# Patient Record
Sex: FEMALE | Race: WHITE | NOT HISPANIC OR LATINO | Employment: PART TIME | URBAN - METROPOLITAN AREA
[De-identification: names, ages, dates, MRNs, and addresses within clinical notes are randomized per-mention and may not be internally consistent; named-entity substitution may affect disease eponyms.]

---

## 2023-03-10 ENCOUNTER — TELEPHONE (OUTPATIENT)
Dept: PRIMARY CARE | Facility: CLINIC | Age: 58
End: 2023-03-10
Payer: COMMERCIAL

## 2023-03-13 LAB
ALANINE AMINOTRANSFERASE (SGPT) (U/L) IN SER/PLAS: 10 U/L (ref 7–45)
ALBUMIN (G/DL) IN SER/PLAS: 4.7 G/DL (ref 3.4–5)
ALKALINE PHOSPHATASE (U/L) IN SER/PLAS: 74 U/L (ref 33–110)
ASPARTATE AMINOTRANSFERASE (SGOT) (U/L) IN SER/PLAS: 16 U/L (ref 9–39)
BASOPHILS (10*3/UL) IN BLOOD BY AUTOMATED COUNT: 0.04 X10E9/L (ref 0–0.1)
BASOPHILS/100 LEUKOCYTES IN BLOOD BY AUTOMATED COUNT: 0.9 % (ref 0–2)
BILIRUBIN DIRECT (MG/DL) IN SER/PLAS: 0 MG/DL (ref 0–0.3)
BILIRUBIN TOTAL (MG/DL) IN SER/PLAS: 0.3 MG/DL (ref 0–1.2)
EOSINOPHILS (10*3/UL) IN BLOOD BY AUTOMATED COUNT: 0.17 X10E9/L (ref 0–0.7)
EOSINOPHILS/100 LEUKOCYTES IN BLOOD BY AUTOMATED COUNT: 3.7 % (ref 0–6)
ERYTHROCYTE DISTRIBUTION WIDTH (RATIO) BY AUTOMATED COUNT: 13.4 % (ref 11.5–14.5)
ERYTHROCYTE MEAN CORPUSCULAR HEMOGLOBIN CONCENTRATION (G/DL) BY AUTOMATED: 34.3 G/DL (ref 32–36)
ERYTHROCYTE MEAN CORPUSCULAR VOLUME (FL) BY AUTOMATED COUNT: 99 FL (ref 80–100)
ERYTHROCYTES (10*6/UL) IN BLOOD BY AUTOMATED COUNT: 3.73 X10E12/L (ref 4–5.2)
HEMATOCRIT (%) IN BLOOD BY AUTOMATED COUNT: 37 % (ref 36–46)
HEMOGLOBIN (G/DL) IN BLOOD: 12.7 G/DL (ref 12–16)
IMMATURE GRANULOCYTES/100 LEUKOCYTES IN BLOOD BY AUTOMATED COUNT: 0.2 % (ref 0–0.9)
LEUKOCYTES (10*3/UL) IN BLOOD BY AUTOMATED COUNT: 4.6 X10E9/L (ref 4.4–11.3)
LYMPHOCYTES (10*3/UL) IN BLOOD BY AUTOMATED COUNT: 1.15 X10E9/L (ref 1.2–4.8)
LYMPHOCYTES/100 LEUKOCYTES IN BLOOD BY AUTOMATED COUNT: 25.2 % (ref 13–44)
MONOCYTES (10*3/UL) IN BLOOD BY AUTOMATED COUNT: 0.29 X10E9/L (ref 0.1–1)
MONOCYTES/100 LEUKOCYTES IN BLOOD BY AUTOMATED COUNT: 6.4 % (ref 2–10)
NEUTROPHILS (10*3/UL) IN BLOOD BY AUTOMATED COUNT: 2.9 X10E9/L (ref 1.2–7.7)
NEUTROPHILS/100 LEUKOCYTES IN BLOOD BY AUTOMATED COUNT: 63.6 % (ref 40–80)
PLATELETS (10*3/UL) IN BLOOD AUTOMATED COUNT: 312 X10E9/L (ref 150–450)
PROTEIN TOTAL: 7.4 G/DL (ref 6.4–8.2)

## 2023-03-14 LAB
IGA (MG/DL) IN SER/PLAS: 259 MG/DL (ref 70–400)
IGG (MG/DL) IN SER/PLAS: 1180 MG/DL (ref 700–1600)
IGM (MG/DL) IN SER/PLAS: 130 MG/DL (ref 40–230)

## 2023-05-02 DIAGNOSIS — E78.5 HYPERLIPIDEMIA, UNSPECIFIED: ICD-10-CM

## 2023-05-05 DIAGNOSIS — E78.5 HYPERLIPIDEMIA, UNSPECIFIED: ICD-10-CM

## 2023-05-05 RX ORDER — ROSUVASTATIN CALCIUM 20 MG/1
TABLET, COATED ORAL
Qty: 90 TABLET | Refills: 3 | Status: SHIPPED | OUTPATIENT
Start: 2023-05-05 | End: 2023-05-08

## 2023-05-08 DIAGNOSIS — E78.5 HYPERLIPIDEMIA, UNSPECIFIED: ICD-10-CM

## 2023-05-08 RX ORDER — ROSUVASTATIN CALCIUM 20 MG/1
TABLET, COATED ORAL
Qty: 90 TABLET | Refills: 3 | Status: SHIPPED | OUTPATIENT
Start: 2023-05-08 | End: 2023-05-11

## 2023-05-11 RX ORDER — ROSUVASTATIN CALCIUM 20 MG/1
TABLET, COATED ORAL
Qty: 90 TABLET | Refills: 3 | Status: SHIPPED | OUTPATIENT
Start: 2023-05-11 | End: 2023-10-05 | Stop reason: SDUPTHER

## 2023-06-02 LAB
ALANINE AMINOTRANSFERASE (SGPT) (U/L) IN SER/PLAS: 11 U/L (ref 7–45)
ALBUMIN (G/DL) IN SER/PLAS: 4.5 G/DL (ref 3.4–5)
ALKALINE PHOSPHATASE (U/L) IN SER/PLAS: 68 U/L (ref 33–110)
ASPARTATE AMINOTRANSFERASE (SGOT) (U/L) IN SER/PLAS: 14 U/L (ref 9–39)
BILIRUBIN DIRECT (MG/DL) IN SER/PLAS: 0 MG/DL (ref 0–0.3)
BILIRUBIN TOTAL (MG/DL) IN SER/PLAS: 0.3 MG/DL (ref 0–1.2)
PROTEIN TOTAL: 6.9 G/DL (ref 6.4–8.2)

## 2023-06-03 LAB
IGA (MG/DL) IN SER/PLAS: 238 MG/DL (ref 70–400)
IGG (MG/DL) IN SER/PLAS: 1090 MG/DL (ref 700–1600)
IGM (MG/DL) IN SER/PLAS: 122 MG/DL (ref 40–230)

## 2023-07-28 LAB
ALANINE AMINOTRANSFERASE (SGPT) (U/L) IN SER/PLAS: 11 U/L (ref 7–45)
ALBUMIN (G/DL) IN SER/PLAS: 4.6 G/DL (ref 3.4–5)
ALKALINE PHOSPHATASE (U/L) IN SER/PLAS: 71 U/L (ref 33–110)
ASPARTATE AMINOTRANSFERASE (SGOT) (U/L) IN SER/PLAS: 14 U/L (ref 9–39)
BASOPHILS (10*3/UL) IN BLOOD BY AUTOMATED COUNT: 0.02 X10E9/L (ref 0–0.1)
BASOPHILS/100 LEUKOCYTES IN BLOOD BY AUTOMATED COUNT: 0.3 % (ref 0–2)
BILIRUBIN DIRECT (MG/DL) IN SER/PLAS: 0.1 MG/DL (ref 0–0.3)
BILIRUBIN TOTAL (MG/DL) IN SER/PLAS: 0.4 MG/DL (ref 0–1.2)
EOSINOPHILS (10*3/UL) IN BLOOD BY AUTOMATED COUNT: 0.1 X10E9/L (ref 0–0.7)
EOSINOPHILS/100 LEUKOCYTES IN BLOOD BY AUTOMATED COUNT: 1.6 % (ref 0–6)
ERYTHROCYTE DISTRIBUTION WIDTH (RATIO) BY AUTOMATED COUNT: 12.6 % (ref 11.5–14.5)
ERYTHROCYTE MEAN CORPUSCULAR HEMOGLOBIN CONCENTRATION (G/DL) BY AUTOMATED: 33.9 G/DL (ref 32–36)
ERYTHROCYTE MEAN CORPUSCULAR VOLUME (FL) BY AUTOMATED COUNT: 97 FL (ref 80–100)
ERYTHROCYTES (10*6/UL) IN BLOOD BY AUTOMATED COUNT: 3.91 X10E12/L (ref 4–5.2)
HEMATOCRIT (%) IN BLOOD BY AUTOMATED COUNT: 37.8 % (ref 36–46)
HEMOGLOBIN (G/DL) IN BLOOD: 12.8 G/DL (ref 12–16)
IMMATURE GRANULOCYTES/100 LEUKOCYTES IN BLOOD BY AUTOMATED COUNT: 0.3 % (ref 0–0.9)
LEUKOCYTES (10*3/UL) IN BLOOD BY AUTOMATED COUNT: 6.2 X10E9/L (ref 4.4–11.3)
LYMPHOCYTES (10*3/UL) IN BLOOD BY AUTOMATED COUNT: 0.82 X10E9/L (ref 1.2–4.8)
LYMPHOCYTES/100 LEUKOCYTES IN BLOOD BY AUTOMATED COUNT: 13.2 % (ref 13–44)
MONOCYTES (10*3/UL) IN BLOOD BY AUTOMATED COUNT: 0.43 X10E9/L (ref 0.1–1)
MONOCYTES/100 LEUKOCYTES IN BLOOD BY AUTOMATED COUNT: 6.9 % (ref 2–10)
NEUTROPHILS (10*3/UL) IN BLOOD BY AUTOMATED COUNT: 4.84 X10E9/L (ref 1.2–7.7)
NEUTROPHILS/100 LEUKOCYTES IN BLOOD BY AUTOMATED COUNT: 77.7 % (ref 40–80)
PLATELETS (10*3/UL) IN BLOOD AUTOMATED COUNT: 306 X10E9/L (ref 150–450)
PROTEIN TOTAL: 7.3 G/DL (ref 6.4–8.2)

## 2023-07-29 LAB
IGA (MG/DL) IN SER/PLAS: 255 MG/DL (ref 70–400)
IGG (MG/DL) IN SER/PLAS: 1120 MG/DL (ref 700–1600)
IGM (MG/DL) IN SER/PLAS: 140 MG/DL (ref 40–230)

## 2023-10-05 DIAGNOSIS — E78.5 HYPERLIPIDEMIA, UNSPECIFIED: ICD-10-CM

## 2023-10-05 RX ORDER — ROSUVASTATIN CALCIUM 20 MG/1
TABLET, COATED ORAL
Qty: 90 TABLET | Refills: 3 | Status: SHIPPED | OUTPATIENT
Start: 2023-10-05

## 2023-10-10 ENCOUNTER — TELEPHONE (OUTPATIENT)
Dept: PRIMARY CARE | Facility: CLINIC | Age: 58
End: 2023-10-10
Payer: COMMERCIAL

## 2023-11-13 DIAGNOSIS — K75.4 AUTOIMMUNE HEPATITIS (MULTI): Primary | ICD-10-CM

## 2023-11-13 RX ORDER — AZATHIOPRINE 50 MG/1
75 TABLET ORAL DAILY
COMMUNITY
End: 2023-11-13 | Stop reason: SDUPTHER

## 2023-11-14 RX ORDER — AZATHIOPRINE 50 MG/1
75 TABLET ORAL DAILY
Qty: 135 TABLET | Refills: 1 | Status: SHIPPED | OUTPATIENT
Start: 2023-11-14 | End: 2024-04-04 | Stop reason: SDUPTHER

## 2024-01-23 NOTE — PROGRESS NOTES
Subjective   Chana Ramirez is a 58 y.o. female who presents for physical exam.  DAQUAN Zayas was last seen by me October 19, 2022 she also follows with Dr. Cooper Lowry MD hepatology which you have seen more recently September 14, 2023 for autoimmune hepatitis required steroid taper and azathioprine history of dyslipidemia was prescribed rosuvastatin which she takes regularly last refilled October 5, 2023 patient was in the management complaint no chest pain shortness of breath fever chill nausea vomiting constipation diarrhea this urgency frequency.  Patient is here for fasting blood works, physical exam. Constipation from time to time, with bloating, Acid reflux.   Review of Systems  10 system are pertinent as above  Objective     Visit Vitals  /80   Pulse 78   Temp 36.3 °C (97.3 °F) (Temporal)   Resp 16      Physical Exam  HEENT: Atraumatic normocephalic the pupils are equal and round and reactive to light the sclerae nonicteric extraocular motion are intact.  Neck: Is supple without JVD no carotid bruits the trachea is midline there are no masses pulses are equal and bilateral with normal upstroke.  Skin: Normal.  Skin good texture.  Moist.  Good turgor.  No lesions, no rashes.  Lymph: No lymphadenopathy appreciated, no masses, no lesions  Lungs: Are clear to auscultation and percussion, good breath sounds bilaterally, no rhonchi, no wheezing, good diaphragmatic excursion.  Heart: Normal rate and normal rhythm S1, S2, no S3, no gallop, murmur or rub.  Abdomen: Soft, nontender, no organomegaly, good bowel sounds.    Extremities: Full range of motion, good pulses bilateral.  No cyanosis, no clubbing or edema.  Neuro: Cranial nerves II-XII are grossly intact there is no sensory or motor deficits.  Able to move all extremities.      Assessment/Plan     Patient is here for physical exam    Fasting blood works    CBC BMP lipids AST LT vitamin 25-hydroxy     Prevention  Colonoscopy 05/22/20219  Mammogram  Dr Lobo last Mamm on record 09/24/22, reordered per patient request  Bone density needed  CACS 32.74 12/2020    Acid reflux   Will try OC Pepcid  Avoid spicy food   If not better will let me know    Immunization  Flu vaccine  10/2024  pneumonia vaccine age 67  Shingles vaccine considering , now with a little sinus issues  [place] vaccine 2/2    Continue with the low-fat, low-cholesterol diet,  I recommended Mediterranean diet, which include fish, chicken, vegetables and olive oil  Exercise daily for 30 minutes at least 3 times a week  Continue home medications  Rosuvastatin 20 mg M,W,F    History of autoimmune hepatitis  Follows with  hepatology  Last visit was September 14, 2023  She appears to be doing well  On azathioprine 75 mg daily steroid completed          Problem List Items Addressed This Visit       Autoimmune hepatitis (CMS/HCC)    Relevant Orders    CBC    Lipid Panel    AST    ALT    Dyslipidemia - Primary    Relevant Orders    Lipid Panel    AST    ALT    Immunosuppression (CMS/HCC)    Relevant Orders    CBC    AST    ALT    Vitamin D deficiency, unspecified    Relevant Orders    Basic Metabolic Panel     Other Visit Diagnoses       Postmenopause        Relevant Orders    XR DEXA bone density    History of anabolic steroid use        Relevant Orders    XR DEXA bone density    Basic Metabolic Panel    Vitamin D insufficiency        Relevant Orders    Vitamin D 25-Hydroxy,Total (for eval of Vitamin D levels)    Screening mammogram for breast cancer        Relevant Orders    BI mammo bilateral screening tomosynthesis              Kit Rhodes MD

## 2024-01-26 ENCOUNTER — OFFICE VISIT (OUTPATIENT)
Dept: PRIMARY CARE | Facility: CLINIC | Age: 59
End: 2024-01-26
Payer: COMMERCIAL

## 2024-01-26 VITALS
TEMPERATURE: 97.3 F | DIASTOLIC BLOOD PRESSURE: 80 MMHG | WEIGHT: 164 LBS | RESPIRATION RATE: 16 BRPM | HEART RATE: 78 BPM | HEIGHT: 62 IN | BODY MASS INDEX: 30.18 KG/M2 | SYSTOLIC BLOOD PRESSURE: 122 MMHG

## 2024-01-26 DIAGNOSIS — E78.5 DYSLIPIDEMIA: Primary | ICD-10-CM

## 2024-01-26 DIAGNOSIS — E55.9 VITAMIN D INSUFFICIENCY: ICD-10-CM

## 2024-01-26 DIAGNOSIS — Z12.31 SCREENING MAMMOGRAM FOR BREAST CANCER: ICD-10-CM

## 2024-01-26 DIAGNOSIS — D84.9 IMMUNOSUPPRESSION (MULTI): ICD-10-CM

## 2024-01-26 DIAGNOSIS — E55.9 VITAMIN D DEFICIENCY, UNSPECIFIED: ICD-10-CM

## 2024-01-26 DIAGNOSIS — Z78.0 POSTMENOPAUSE: ICD-10-CM

## 2024-01-26 DIAGNOSIS — Z92.241 HISTORY OF ANABOLIC STEROID USE: ICD-10-CM

## 2024-01-26 DIAGNOSIS — K75.4 AUTOIMMUNE HEPATITIS (MULTI): ICD-10-CM

## 2024-01-26 PROCEDURE — 84460 ALANINE AMINO (ALT) (SGPT): CPT | Performed by: INTERNAL MEDICINE

## 2024-01-26 PROCEDURE — 80048 BASIC METABOLIC PNL TOTAL CA: CPT | Performed by: INTERNAL MEDICINE

## 2024-01-26 PROCEDURE — 99396 PREV VISIT EST AGE 40-64: CPT | Performed by: INTERNAL MEDICINE

## 2024-01-26 PROCEDURE — 82306 VITAMIN D 25 HYDROXY: CPT | Performed by: INTERNAL MEDICINE

## 2024-01-26 PROCEDURE — 80061 LIPID PANEL: CPT | Performed by: INTERNAL MEDICINE

## 2024-01-26 PROCEDURE — 85025 COMPLETE CBC W/AUTO DIFF WBC: CPT | Performed by: INTERNAL MEDICINE

## 2024-01-26 PROCEDURE — 84450 TRANSFERASE (AST) (SGOT): CPT | Performed by: INTERNAL MEDICINE

## 2024-01-26 PROCEDURE — 1036F TOBACCO NON-USER: CPT | Performed by: INTERNAL MEDICINE

## 2024-01-26 RX ORDER — VIT C/E/ZN/COPPR/LUTEIN/ZEAXAN 250MG-90MG
1 CAPSULE ORAL DAILY
COMMUNITY

## 2024-01-26 ASSESSMENT — PAIN SCALES - GENERAL: PAINLEVEL: 0-NO PAIN

## 2024-01-29 ENCOUNTER — TELEPHONE (OUTPATIENT)
Dept: PRIMARY CARE | Facility: CLINIC | Age: 59
End: 2024-01-29
Payer: COMMERCIAL

## 2024-01-29 NOTE — TELEPHONE ENCOUNTER
----- Message from Kit Rhodes MD sent at 1/28/2024  2:36 PM EST -----  Good overall blood work, continue with current medications diet and exercise will repeat  fasting Blood work in three months

## 2024-02-09 ENCOUNTER — HOSPITAL ENCOUNTER (OUTPATIENT)
Dept: RADIOLOGY | Facility: HOSPITAL | Age: 59
Discharge: HOME | End: 2024-02-09
Payer: COMMERCIAL

## 2024-02-09 DIAGNOSIS — Z12.31 SCREENING MAMMOGRAM FOR BREAST CANCER: ICD-10-CM

## 2024-02-09 DIAGNOSIS — Z92.241 HISTORY OF ANABOLIC STEROID USE: ICD-10-CM

## 2024-02-09 DIAGNOSIS — Z78.0 POSTMENOPAUSE: ICD-10-CM

## 2024-02-09 PROCEDURE — 77080 DXA BONE DENSITY AXIAL: CPT | Performed by: RADIOLOGY

## 2024-02-09 PROCEDURE — 77080 DXA BONE DENSITY AXIAL: CPT

## 2024-02-09 PROCEDURE — 77063 BREAST TOMOSYNTHESIS BI: CPT | Performed by: RADIOLOGY

## 2024-02-09 PROCEDURE — 77067 SCR MAMMO BI INCL CAD: CPT

## 2024-02-09 PROCEDURE — 77067 SCR MAMMO BI INCL CAD: CPT | Performed by: RADIOLOGY

## 2024-02-11 DIAGNOSIS — R92.8 ABNORMAL MAMMOGRAM: Primary | ICD-10-CM

## 2024-02-13 ENCOUNTER — TELEPHONE (OUTPATIENT)
Dept: GASTROENTEROLOGY | Facility: CLINIC | Age: 59
End: 2024-02-13
Payer: COMMERCIAL

## 2024-02-13 NOTE — TELEPHONE ENCOUNTER
"Hepatology Nurse Coordinator Note  Called patient back. She was about to start a class at work. Requested a call back after 3:30 pm.      Addendum 2/13/2024 3:46 PM  Spoke with patient's regarding GI concerns she is reporting. Patient states she has been having \"digestive issues consistently for the past few weeks.\" Patient endorses constant bloating, as well as flatus, and belching. She states she feels \"blah\" lately. She states her \"bowel movements are very small, and I have to keep wiping even though a little comes out.\" She states she did have a \"3 day period where she didn't have a bowel movement.\" She states her PCP wrote for metamucil a \"few weeks ago. It's not helping, but I'm still taking it.\" Patient did endorse her GI issues are \"worse after meals.\" She would like to know if Dr. Lowry can provide recommendations and manage this, or wants to know if she needs to be referred to GI. Patient is aware I would forward her concerns to Dr. Lowry. She verbalized understanding.   "

## 2024-02-16 NOTE — TELEPHONE ENCOUNTER
Hepatology Nurse Coordinator Note  Called patient to provide recommendation from Dr. Lowry regarding her concerns. She's aware Dr. Lowry is okay assessing her GI symptoms, but that he would like to see her in clinic for follow up and to assess. Patient made aware. She was scheduled for first available visit in April. She's aware I would speak with Dr. Lowry to see if she is able to be seen sooner. She verbalized understanding.

## 2024-02-22 ENCOUNTER — HOSPITAL ENCOUNTER (OUTPATIENT)
Dept: RADIOLOGY | Facility: HOSPITAL | Age: 59
Discharge: HOME | End: 2024-02-22
Payer: COMMERCIAL

## 2024-02-22 DIAGNOSIS — R92.8 ABNORMAL MAMMOGRAM: Primary | ICD-10-CM

## 2024-02-22 DIAGNOSIS — R92.8 ABNORMAL MAMMOGRAM: ICD-10-CM

## 2024-02-22 PROCEDURE — 76981 USE PARENCHYMA: CPT | Mod: LT

## 2024-02-22 PROCEDURE — 76642 ULTRASOUND BREAST LIMITED: CPT | Mod: LT

## 2024-03-27 PROBLEM — H10.9 CONJUNCTIVITIS: Status: ACTIVE | Noted: 2024-03-27

## 2024-03-27 PROBLEM — B34.9 VIRAL INFECTION: Status: ACTIVE | Noted: 2024-03-27

## 2024-03-27 PROBLEM — R63.8 UNABLE TO LOSE WEIGHT: Status: ACTIVE | Noted: 2024-03-27

## 2024-03-27 PROBLEM — R32 INCONTINENCE: Status: ACTIVE | Noted: 2024-03-27

## 2024-03-27 PROBLEM — Z86.69 HISTORY OF DISORDER OF GLOBE OF EYE: Status: ACTIVE | Noted: 2024-03-27

## 2024-03-27 PROBLEM — R52 GENERALIZED PAIN: Status: ACTIVE | Noted: 2024-03-27

## 2024-03-27 PROBLEM — N95.8 GENITOURINARY SYNDROME OF MENOPAUSE: Status: ACTIVE | Noted: 2024-03-27

## 2024-03-27 PROBLEM — R17 JAUNDICE: Status: ACTIVE | Noted: 2024-03-27

## 2024-03-27 PROBLEM — M25.569 KNEE PAIN: Status: ACTIVE | Noted: 2024-03-27

## 2024-03-27 PROBLEM — M25.50 ARTHRALGIA: Status: ACTIVE | Noted: 2024-03-27

## 2024-03-27 PROBLEM — M75.82 TENDINITIS OF LEFT ROTATOR CUFF: Status: ACTIVE | Noted: 2024-03-27

## 2024-03-27 PROBLEM — M15.9 OSTEOARTHRITIS OF MULTIPLE JOINTS: Status: ACTIVE | Noted: 2024-03-27

## 2024-03-27 PROBLEM — M79.7 FIBROMYALGIA: Status: ACTIVE | Noted: 2024-03-27

## 2024-03-27 RX ORDER — ACETAMINOPHEN 325 MG/1
TABLET ORAL
COMMUNITY

## 2024-03-27 RX ORDER — AA/PROT/LYSINE/METHIO/VIT C/B6 50-12.5 MG
TABLET ORAL
COMMUNITY

## 2024-03-27 RX ORDER — IBUPROFEN 200 MG
TABLET ORAL
COMMUNITY

## 2024-04-04 ENCOUNTER — OFFICE VISIT (OUTPATIENT)
Dept: GASTROENTEROLOGY | Facility: CLINIC | Age: 59
End: 2024-04-04
Payer: COMMERCIAL

## 2024-04-04 ENCOUNTER — LAB (OUTPATIENT)
Dept: LAB | Facility: LAB | Age: 59
End: 2024-04-04
Payer: COMMERCIAL

## 2024-04-04 VITALS
TEMPERATURE: 96.6 F | HEART RATE: 91 BPM | BODY MASS INDEX: 30.18 KG/M2 | SYSTOLIC BLOOD PRESSURE: 111 MMHG | WEIGHT: 165 LBS | DIASTOLIC BLOOD PRESSURE: 73 MMHG

## 2024-04-04 DIAGNOSIS — R19.4 CHANGE IN BOWEL HABITS: ICD-10-CM

## 2024-04-04 DIAGNOSIS — K75.4 AUTOIMMUNE HEPATITIS (MULTI): ICD-10-CM

## 2024-04-04 DIAGNOSIS — K75.4 AUTOIMMUNE HEPATITIS (MULTI): Primary | ICD-10-CM

## 2024-04-04 DIAGNOSIS — Z79.899 HIGH RISK MEDICATION USE: ICD-10-CM

## 2024-04-04 LAB
ALBUMIN SERPL BCP-MCNC: 4.6 G/DL (ref 3.4–5)
ALP SERPL-CCNC: 73 U/L (ref 33–110)
ALT SERPL W P-5'-P-CCNC: 12 U/L (ref 7–45)
AST SERPL W P-5'-P-CCNC: 15 U/L (ref 9–39)
BASOPHILS # BLD AUTO: 0.03 X10*3/UL (ref 0–0.1)
BASOPHILS NFR BLD AUTO: 0.6 %
BILIRUB DIRECT SERPL-MCNC: 0 MG/DL (ref 0–0.3)
BILIRUB SERPL-MCNC: 0.3 MG/DL (ref 0–1.2)
EOSINOPHIL # BLD AUTO: 0.18 X10*3/UL (ref 0–0.7)
EOSINOPHIL NFR BLD AUTO: 3.5 %
ERYTHROCYTE [DISTWIDTH] IN BLOOD BY AUTOMATED COUNT: 13.2 % (ref 11.5–14.5)
HCT VFR BLD AUTO: 38.7 % (ref 36–46)
HGB BLD-MCNC: 12.5 G/DL (ref 12–16)
IMM GRANULOCYTES # BLD AUTO: 0.01 X10*3/UL (ref 0–0.7)
IMM GRANULOCYTES NFR BLD AUTO: 0.2 % (ref 0–0.9)
LYMPHOCYTES # BLD AUTO: 1.01 X10*3/UL (ref 1.2–4.8)
LYMPHOCYTES NFR BLD AUTO: 19.8 %
MCH RBC QN AUTO: 32.3 PG (ref 26–34)
MCHC RBC AUTO-ENTMCNC: 32.3 G/DL (ref 32–36)
MCV RBC AUTO: 100 FL (ref 80–100)
MONOCYTES # BLD AUTO: 0.4 X10*3/UL (ref 0.1–1)
MONOCYTES NFR BLD AUTO: 7.8 %
NEUTROPHILS # BLD AUTO: 3.47 X10*3/UL (ref 1.2–7.7)
NEUTROPHILS NFR BLD AUTO: 68.1 %
NRBC BLD-RTO: 0 /100 WBCS (ref 0–0)
PLATELET # BLD AUTO: 297 X10*3/UL (ref 150–450)
PROT SERPL-MCNC: 7.2 G/DL (ref 6.4–8.2)
RBC # BLD AUTO: 3.87 X10*6/UL (ref 4–5.2)
WBC # BLD AUTO: 5.1 X10*3/UL (ref 4.4–11.3)

## 2024-04-04 PROCEDURE — 85025 COMPLETE CBC W/AUTO DIFF WBC: CPT

## 2024-04-04 PROCEDURE — 99215 OFFICE O/P EST HI 40 MIN: CPT | Performed by: INTERNAL MEDICINE

## 2024-04-04 PROCEDURE — 36415 COLL VENOUS BLD VENIPUNCTURE: CPT

## 2024-04-04 PROCEDURE — 80076 HEPATIC FUNCTION PANEL: CPT

## 2024-04-04 RX ORDER — AZATHIOPRINE 50 MG/1
75 TABLET ORAL DAILY
Qty: 45 TABLET | Refills: 11 | Status: SHIPPED | OUTPATIENT
Start: 2024-04-04 | End: 2024-05-28 | Stop reason: SDUPTHER

## 2024-04-04 ASSESSMENT — PAIN SCALES - GENERAL: PAINLEVEL: 0-NO PAIN

## 2024-04-04 NOTE — LETTER
April 12, 2024     Kit Rhodes MD  [address] [facility] [address]    Patient: Chana Ramirez   YOB: 1965   Date of Visit: 4/4/2024       Dear Dr. Kit Rhodes MD:    Thank you for referring Chana Ramirez to me for evaluation. Below are my notes for this consultation.  If you have questions, please do not hesitate to call me. I look forward to following your patient along with you.       Sincerely,     Cooper Lowry MD      CC: No Recipients  ______________________________________________________________________________________    Subjective     Follow up visit for AIH.    History of Present Illness:   Chana Ramirez is a 58 y.o. female who presents to the [facility] at [facility] for a follow up appointment regarding a diagnosis of AIH.    Today, she reports a number of gastrointestinal symptoms.    Nausea - since January. No emesis.  Belching, flatulence.   Some heartburn.    In the past week:  Describes a tarry type of stool, not black. ? Pastey. Brown.   Also small pellets.  Previously, very regular.    PCP Dr. Rhodes, recommended Metamucil.  Used it every day until had a bowel movement. ~ 1 week.  Then stopped.    Summary:  57 y/o F otherwise healthy admitted with a severe hepatitis and high LFTs at the end of March 2020 - first at [facility] and transferred downtown to [facility].     Prior to admission, she was feeling poorly and run down. Was seen at the [facility] x 2 and prescribed Keflex for a possible UTI. She tested positive for Influenza A. COVID-19 PCR negative.      She has asked if it is possible that she had COVID prior to this hospitalization - also inquired about antibody testing.   She did complete antibody testing - SARS COV 2 IgG negative.     YAKOV, SMA both 1-80.  A liver biopsy was done - severe hepatitis but not classic for AIH.  She was started on prednisone 20 mg. F/u labs on April 2, 2020 not much better with ALT > 1000.   Prednisone  was increased to 40 mg daily by Dr. Dumas.      A week later Azathioprine was prescribed (50 mg daily). She had a biochemical response to the higher dose of prednisone. She had resolution of all of her symptoms and a complete biochemical response. Her prednisone was further tapered down to 20 mg daily with a very slight bump in her LFTs.   Prednisone was increased to 25 mg, Azathioprine to 75 mg daily.      She has since achieved biochemical remission.   Off prednisone since April 2021.   Doing well on Azathioprine 75 mg daily (decreased from 100 mg)..     Overall, she feels well.   No symptoms of jaundice or abdominal pain.  Today, we reviewed labwork, Fibroscan results.     She has established care with a PCP, Dr. Rhodes.      Jan 2024 Labs  ALT 24, AST 16    Review of Systems  Review of Systems   All other systems reviewed and are negative.      Past Medical History   has a past medical history of Personal history of other diseases of the nervous system and sense organs.     Social History   reports that she has never smoked. She has never been exposed to tobacco smoke. She has never used smokeless tobacco. She reports that she does not currently use alcohol. She reports that she does not use drugs.     Family History  family history is not on file.     Allergies  Allergies   Allergen Reactions   • Guaifenesin Rash       Medications  Current Outpatient Medications   Medication Instructions   • acetaminophen (Tylenol) 325 mg tablet oral   • azaTHIOprine (IMURAN) 75 mg, oral, Daily   • cholecalciferol (Vitamin D-3) 25 MCG (1000 UT) capsule 1 tablet, oral, Daily   • coenzyme Q-10 10 mg capsule oral   • ibuprofen 200 mg tablet oral   • rosuvastatin (Crestor) 20 mg tablet TAKE 1 TABLET OTHER 1 TABLET ON MONDAY 1 TABLET ON FRIDAY CO-Q10 200 MG MONDAY WEDNESDAY AND FRIDAY        Objective   Visit Vitals  /73   Pulse 91   Temp 35.9 °C (96.6 °F)          12/13/2021     4:10 PM 12/13/2021     4:14 PM 3/11/2022     "12:34 PM 10/14/2022     1:38 PM 10/19/2022    11:40 AM 1/26/2024     9:08 AM 4/4/2024     2:36 PM   Vitals   Systolic  108  107  122 111   Diastolic  62  70  80 73   Heart Rate    77  78 91   Temp    36.4 °C (97.6 °F)  36.3 °C (97.3 °F) 35.9 °C (96.6 °F)   Resp      16    Height (in)    1.575 m (5' 2\")  1.575 m (5' 2\")    Weight (lb) 164  160 162 160 164 165   BMI 29.28 kg/m2  28.57 kg/m2 29.63 kg/m2 29.26 kg/m2 30 kg/m2 30.18 kg/m2   BSA (m2) 1.81 m2  1.79 m2 1.79 m2 1.78 m2 1.8 m2 1.81 m2   Visit Report      Report Report     Physical Exam  Vitals and nursing note reviewed.   Constitutional:       General: She is awake.      Appearance: Normal appearance.   HENT:      Head: Normocephalic and atraumatic.      Nose: Nose normal.      Mouth/Throat:      Mouth: Mucous membranes are moist.   Eyes:      Pupils: Pupils are equal, round, and reactive to light.   Cardiovascular:      Rate and Rhythm: Normal rate.   Pulmonary:      Effort: Pulmonary effort is normal.   Neurological:      Mental Status: She is alert and oriented to person, place, and time. Mental status is at baseline.   Psychiatric:         Attention and Perception: Attention and perception normal.         Mood and Affect: Mood normal.         Behavior: Behavior normal.         Labs    WBC   Date/Time Value Ref Range Status   07/28/2023 03:17 PM 6.2 4.4 - 11.3 x10E9/L Final     Hemoglobin   Date/Time Value Ref Range Status   07/28/2023 03:17 PM 12.8 12.0 - 16.0 g/dL Final     Hematocrit   Date/Time Value Ref Range Status   07/28/2023 03:17 PM 37.8 36.0 - 46.0 % Final     MCV   Date/Time Value Ref Range Status   07/28/2023 03:17 PM 97 80 - 100 fL Final     Platelets   Date/Time Value Ref Range Status   07/28/2023 03:17  150 - 450 x10E9/L Final        Total Protein   Date/Time Value Ref Range Status   07/28/2023 03:17 PM 7.3 6.4 - 8.2 g/dL Final     Albumin   Date/Time Value Ref Range Status   07/28/2023 03:17 PM 4.6 3.4 - 5.0 g/dL Final     AST " "  Date/Time Value Ref Range Status   07/28/2023 03:17 PM 14 9 - 39 U/L Final     ALT (SGPT)   Date/Time Value Ref Range Status   07/28/2023 03:17 PM 11 7 - 45 U/L Final     Comment:      Patients treated with Sulfasalazine may generate    falsely decreased results for ALT.       Alkaline Phosphatase   Date/Time Value Ref Range Status   07/28/2023 03:17 PM 71 33 - 110 U/L Final     Total Bilirubin   Date/Time Value Ref Range Status   07/28/2023 03:17 PM 0.4 0.0 - 1.2 mg/dL Final     Bilirubin, Direct   Date/Time Value Ref Range Status   07/28/2023 03:17 PM 0.1 0.0 - 0.3 mg/dL Final        No results found for: \"VITD25\", \"VITAMINA\", \"VTAI\", \"VITEALPHA\", \"VITEGAMMA\"     AST   Date/Time Value Ref Range Status   07/28/2023 03:17 PM 14 9 - 39 U/L Final     ALT (SGPT)   Date/Time Value Ref Range Status   07/28/2023 03:17 PM 11 7 - 45 U/L Final     Comment:      Patients treated with Sulfasalazine may generate    falsely decreased results for ALT.       Alkaline Phosphatase   Date/Time Value Ref Range Status   07/28/2023 03:17 PM 71 33 - 110 U/L Final     Total Bilirubin   Date/Time Value Ref Range Status   07/28/2023 03:17 PM 0.4 0.0 - 1.2 mg/dL Final     Bilirubin, Direct   Date/Time Value Ref Range Status   07/28/2023 03:17 PM 0.1 0.0 - 0.3 mg/dL Final     Albumin   Date/Time Value Ref Range Status   07/28/2023 03:17 PM 4.6 3.4 - 5.0 g/dL Final     Total Protein   Date/Time Value Ref Range Status   07/28/2023 03:17 PM 7.3 6.4 - 8.2 g/dL Final        Protime   Date/Time Value Ref Range Status   12/06/2021 04:11 PM 10.8 9.8 - 13.4 sec Final     Comment:     Note new reference range as of 11/30/2021 at 10:00am.     INR   Date/Time Value Ref Range Status   12/06/2021 04:11 PM 0.9 0.9 - 1.1 Final     Fibroscan  2023    Results:     Median = 3.4 kPa       IQR/med= 8 %       CAP= 146 dB/m    Assessment/Plan   Chana Ramirez is a 58 y.o. female who presents to Hepatology clinic for follow up.    Impression: Autoimmune " hepatitis - with a biochemical response. LFTs bumped (slightly) during initial taper when prednisone dose was reduced to 20 mg daily. Normal LFTs with subsequent changes. In remission.      prednisone was discontinued - April 2021;  She is tolerating Azathioprine 75 mg - without any issues.  We reduced the dose of the Azathioprine at her last appointment about 6 m ago (from 100 mg) - will continue at this dose.     Statin use is safe in liver disease. Her autoimmune hepatitis is under control, with normal LFTs and biochemical remission. She can be safely prescribed a daily statin, if clinically indicated. I do note the recent Lipid panel. I encouraged her to discuss further with Dr. Rhodes.    We discussed her bowel symptoms. I have suggested Metamucil daily (1 tsp for about 2 weeks, and to increase two 1 tbsp daily as tolerated) to help regulate her bowel movements.       Plan:     Azathioprine 75 mg daily (continue).   Fibroscan of the liver - 9/1/23 (no fibrosis):    Repeat LFTs every 6m. Follow up in 6m.    Instructions      Cooper Lowry MD

## 2024-04-04 NOTE — PROGRESS NOTES
Subjective     Follow up visit for AIH.    History of Present Illness:   Chana Ramirez is a 58 y.o. female who presents to the [facility] at [facility] for a follow up appointment regarding a diagnosis of AIH.    Today, she reports a number of gastrointestinal symptoms.    Nausea - since January. No emesis.  Belching, flatulence.   Some heartburn.    In the past week:  Describes a tarry type of stool, not black. ? Pastey. Brown.   Also small pellets.  Previously, very regular.    PCP Dr. Rhodes, recommended Metamucil.  Used it every day until had a bowel movement. ~ 1 week.  Then stopped.    Summary:  59 y/o F otherwise healthy admitted with a severe hepatitis and high LFTs at the end of March 2020 - first at [facility] and transferred downtown to [facility].     Prior to admission, she was feeling poorly and run down. Was seen at [facility] x 2 and prescribed Keflex for a possible UTI. She tested positive for Influenza A. COVID-19 PCR negative.      She has asked if it is possible that she had COVID prior to this hospitalization - also inquired about antibody testing.   She did complete antibody testing - SARS COV 2 IgG negative.     YAKOV, SMA both 1-80.  A liver biopsy was done - severe hepatitis but not classic for AIH.  She was started on prednisone 20 mg. F/u labs on April 2, 2020 not much better with ALT > 1000.   Prednisone was increased to 40 mg daily by Dr. Dumas.      A week later Azathioprine was prescribed (50 mg daily). She had a biochemical response to the higher dose of prednisone. She had resolution of all of her symptoms and a complete biochemical response. Her prednisone was further tapered down to 20 mg daily with a very slight bump in her LFTs.   Prednisone was increased to 25 mg, Azathioprine to 75 mg daily.      She has since achieved biochemical remission.   Off prednisone since April 2021.   Doing well on Azathioprine 75 mg daily (decreased from 100 mg)..     Overall,  "she feels well.   No symptoms of jaundice or abdominal pain.  Today, we reviewed labwork, Fibroscan results.     She has established care with a PCP, Dr. Rhodes.      Jan 2024 Labs  ALT 24, AST 16    Review of Systems  Review of Systems   All other systems reviewed and are negative.      Past Medical History   has a past medical history of Personal history of other diseases of the nervous system and sense organs.     Social History   reports that she has never smoked. She has never been exposed to tobacco smoke. She has never used smokeless tobacco. She reports that she does not currently use alcohol. She reports that she does not use drugs.     Family History  family history is not on file.     Allergies  Allergies   Allergen Reactions    Guaifenesin Rash       Medications  Current Outpatient Medications   Medication Instructions    acetaminophen (Tylenol) 325 mg tablet oral    azaTHIOprine (IMURAN) 75 mg, oral, Daily    cholecalciferol (Vitamin D-3) 25 MCG (1000 UT) capsule 1 tablet, oral, Daily    coenzyme Q-10 10 mg capsule oral    ibuprofen 200 mg tablet oral    rosuvastatin (Crestor) 20 mg tablet TAKE 1 TABLET OTHER 1 TABLET ON MONDAY 1 TABLET ON FRIDAY CO-Q10 200 MG MONDAY WEDNESDAY AND FRIDAY        Objective   Visit Vitals  /73   Pulse 91   Temp 35.9 °C (96.6 °F)          12/13/2021     4:10 PM 12/13/2021     4:14 PM 3/11/2022    12:34 PM 10/14/2022     1:38 PM 10/19/2022    11:40 AM 1/26/2024     9:08 AM 4/4/2024     2:36 PM   Vitals   Systolic  108  107  122 111   Diastolic  62  70  80 73   Heart Rate    77  78 91   Temp    36.4 °C (97.6 °F)  36.3 °C (97.3 °F) 35.9 °C (96.6 °F)   Resp      16    Height (in)    1.575 m (5' 2\")  1.575 m (5' 2\")    Weight (lb) 164  160 162 160 164 165   BMI 29.28 kg/m2  28.57 kg/m2 29.63 kg/m2 29.26 kg/m2 30 kg/m2 30.18 kg/m2   BSA (m2) 1.81 m2  1.79 m2 1.79 m2 1.78 m2 1.8 m2 1.81 m2   Visit Report      Report Report     Physical Exam  Vitals and nursing note reviewed. "   Constitutional:       General: She is awake.      Appearance: Normal appearance.   HENT:      Head: Normocephalic and atraumatic.      Nose: Nose normal.      Mouth/Throat:      Mouth: Mucous membranes are moist.   Eyes:      Pupils: Pupils are equal, round, and reactive to light.   Cardiovascular:      Rate and Rhythm: Normal rate.   Pulmonary:      Effort: Pulmonary effort is normal.   Neurological:      Mental Status: She is alert and oriented to person, place, and time. Mental status is at baseline.   Psychiatric:         Attention and Perception: Attention and perception normal.         Mood and Affect: Mood normal.         Behavior: Behavior normal.         Labs    WBC   Date/Time Value Ref Range Status   07/28/2023 03:17 PM 6.2 4.4 - 11.3 x10E9/L Final     Hemoglobin   Date/Time Value Ref Range Status   07/28/2023 03:17 PM 12.8 12.0 - 16.0 g/dL Final     Hematocrit   Date/Time Value Ref Range Status   07/28/2023 03:17 PM 37.8 36.0 - 46.0 % Final     MCV   Date/Time Value Ref Range Status   07/28/2023 03:17 PM 97 80 - 100 fL Final     Platelets   Date/Time Value Ref Range Status   07/28/2023 03:17  150 - 450 x10E9/L Final        Total Protein   Date/Time Value Ref Range Status   07/28/2023 03:17 PM 7.3 6.4 - 8.2 g/dL Final     Albumin   Date/Time Value Ref Range Status   07/28/2023 03:17 PM 4.6 3.4 - 5.0 g/dL Final     AST   Date/Time Value Ref Range Status   07/28/2023 03:17 PM 14 9 - 39 U/L Final     ALT (SGPT)   Date/Time Value Ref Range Status   07/28/2023 03:17 PM 11 7 - 45 U/L Final     Comment:      Patients treated with Sulfasalazine may generate    falsely decreased results for ALT.       Alkaline Phosphatase   Date/Time Value Ref Range Status   07/28/2023 03:17 PM 71 33 - 110 U/L Final     Total Bilirubin   Date/Time Value Ref Range Status   07/28/2023 03:17 PM 0.4 0.0 - 1.2 mg/dL Final     Bilirubin, Direct   Date/Time Value Ref Range Status   07/28/2023 03:17 PM 0.1 0.0 - 0.3 mg/dL Final     "    No results found for: \"VITD25\", \"VITAMINA\", \"VTAI\", \"VITEALPHA\", \"VITEGAMMA\"     AST   Date/Time Value Ref Range Status   07/28/2023 03:17 PM 14 9 - 39 U/L Final     ALT (SGPT)   Date/Time Value Ref Range Status   07/28/2023 03:17 PM 11 7 - 45 U/L Final     Comment:      Patients treated with Sulfasalazine may generate    falsely decreased results for ALT.       Alkaline Phosphatase   Date/Time Value Ref Range Status   07/28/2023 03:17 PM 71 33 - 110 U/L Final     Total Bilirubin   Date/Time Value Ref Range Status   07/28/2023 03:17 PM 0.4 0.0 - 1.2 mg/dL Final     Bilirubin, Direct   Date/Time Value Ref Range Status   07/28/2023 03:17 PM 0.1 0.0 - 0.3 mg/dL Final     Albumin   Date/Time Value Ref Range Status   07/28/2023 03:17 PM 4.6 3.4 - 5.0 g/dL Final     Total Protein   Date/Time Value Ref Range Status   07/28/2023 03:17 PM 7.3 6.4 - 8.2 g/dL Final        Protime   Date/Time Value Ref Range Status   12/06/2021 04:11 PM 10.8 9.8 - 13.4 sec Final     Comment:     Note new reference range as of 11/30/2021 at 10:00am.     INR   Date/Time Value Ref Range Status   12/06/2021 04:11 PM 0.9 0.9 - 1.1 Final     Fibroscan  2023    Results:     Median = 3.4 kPa       IQR/med= 8 %       CAP= 146 dB/m    Assessment/Plan   Chana Ramirez is a 58 y.o. female who presents to Hepatology clinic for follow up.    Impression: Autoimmune hepatitis - with a biochemical response. LFTs bumped (slightly) during initial taper when prednisone dose was reduced to 20 mg daily. Normal LFTs with subsequent changes. In remission.      prednisone was discontinued - April 2021;  She is tolerating Azathioprine 75 mg - without any issues.  We reduced the dose of the Azathioprine at her last appointment about 6 m ago (from 100 mg) - will continue at this dose.     Statin use is safe in liver disease. Her autoimmune hepatitis is under control, with normal LFTs and biochemical remission. She can be safely prescribed a daily statin, if " clinically indicated. I do note the recent Lipid panel. I encouraged her to discuss further with Dr. Rhodes.    We discussed her bowel symptoms. I have suggested Metamucil daily (1 tsp for about 2 weeks, and to increase two 1 tbsp daily as tolerated) to help regulate her bowel movements.       Plan:     Azathioprine 75 mg daily (continue).   Fibroscan of the liver - 9/1/23 (no fibrosis):    Repeat LFTs every 6m. Follow up in 6m.    Instructions      Cooper Lowry MD

## 2024-04-04 NOTE — PATIENT INSTRUCTIONS
Welcome to Dr. Cooper Lowry[facility].  Dr. Lowry sees patients at the following sites:  [place] [address] [facility] at [facility]  [address], [address] at [facility] at [facility], [facility] [address]    Dr. Lowry's hepatology care coordinator, Teresa FALLON, can be reached at [phone].  Dr. Lowry's , Christina Dominguez, can be reached at [phone].    Get blood work now.     Continue Azathioprine at 75 mg daily.  I sent a refill to your pharmacy.    We discussed a trial of Metamucil for the next 4 weeks.   If symptoms do not improve, please message me on My Chart.  Based on your response, will discuss if you need an EGD or Colonoscopy.     Follow up with me in clinic in 6 months. Call in 2 weeks to schedule.   [phone]

## 2024-05-28 DIAGNOSIS — K75.4 AUTOIMMUNE HEPATITIS (MULTI): ICD-10-CM

## 2024-05-28 DIAGNOSIS — R19.4 CHANGE IN BOWEL HABITS: ICD-10-CM

## 2024-05-28 RX ORDER — AZATHIOPRINE 50 MG/1
75 TABLET ORAL DAILY
Qty: 135 TABLET | Refills: 3 | Status: SHIPPED | OUTPATIENT
Start: 2024-05-28 | End: 2025-05-28

## 2024-06-18 ENCOUNTER — TELEPHONE (OUTPATIENT)
Dept: GASTROENTEROLOGY | Facility: CLINIC | Age: 59
End: 2024-06-18
Payer: COMMERCIAL

## 2024-06-18 NOTE — TELEPHONE ENCOUNTER
"Hepatology Nurse Coordinator Note  Patient called and left a voicemail to give update on her GI symptoms. She stated \"Dr. Lowry had wanted her to try a couple of things for her GI issues and she is still not feeling good. Would like to know the next steps?\"    Called patient to further assess. No answer. Left a voicemail message requesting a call back.     "

## 2024-06-20 ENCOUNTER — OFFICE VISIT (OUTPATIENT)
Dept: GASTROENTEROLOGY | Facility: CLINIC | Age: 59
End: 2024-06-20
Payer: COMMERCIAL

## 2024-06-20 DIAGNOSIS — K75.4 AUTOIMMUNE HEPATITIS (MULTI): ICD-10-CM

## 2024-06-20 DIAGNOSIS — R10.33 UMBILICAL PAIN: Primary | ICD-10-CM

## 2024-06-20 DIAGNOSIS — R19.4 CHANGE IN BOWEL HABITS: ICD-10-CM

## 2024-06-20 PROCEDURE — 99213 OFFICE O/P EST LOW 20 MIN: CPT | Performed by: INTERNAL MEDICINE

## 2024-06-20 RX ORDER — AZATHIOPRINE 50 MG/1
75 TABLET ORAL DAILY
Qty: 135 TABLET | Refills: 3 | Status: SHIPPED | OUTPATIENT
Start: 2024-06-20 | End: 2025-06-20

## 2024-06-20 NOTE — LETTER
June 25, 2024     Kit Rhodes MD  [address] [facility] [address]    Patient: Chana Ramirez   YOB: 1965   Date of Visit: 6/20/2024       Dear Dr. Kit Rhodes MD:    Thank you for referring Chana Ramirez to me for evaluation. Below are my notes for this consultation.  If you have questions, please do not hesitate to call me. I look forward to following your patient along with you.       Sincerely,     Cooper Lowry MD      CC: No Recipients  ______________________________________________________________________________________    Subjective    Follow up visit for AIH.    Telephone visit.    History of Present Illness:   Chana Ramirez is a 58 y.o. female who [facility] for a follow up appointment regarding a diagnosis of AIH.    Today, we discusse a number of gastrointestinal symptoms.    Nausea - since January. No emesis.  Belching, flatulence.   Some heartburn.    Last visit:  Described a tarry type of stool, not black. ? Pastey. Brown.   Also small pellets.  Previously, very regular.    PCP Dr. Rhodes, recommended Metamucil.  I had recommended taking it regularly. This helped to a degree. She has been taking it consistently. However, she is not back to baseline.     More specifically:  Since we last spoke - started using Metamucil table spoon daily. Gets some indigestion with a Metamucil.  Bowel movement frequency has improved, not at baseline. Frequency, ranges from daily to every other day.  No longer with pastey stools. + Lower abdominal discomfort.    Summary:  57 y/o F otherwise healthy admitted with a severe hepatitis and high LFTs at the end of March 2020 - first at [facility] and transferred downtown to [facility].     Prior to admission, she was feeling poorly and run down. Was seen at the [facility] x 2 and prescribed Keflex for a possible UTI. She tested positive for Influenza A. COVID-19 PCR negative.      She has asked if it is possible  that she had COVID prior to this hospitalization - also inquired about antibody testing.   She did complete antibody testing - SARS COV 2 IgG negative.     YAKOV, SMA both 1-80.  A liver biopsy was done - severe hepatitis but not classic for AIH.  She was started on prednisone 20 mg. F/u labs on April 2, 2020 not much better with ALT > 1000.   Prednisone was increased to 40 mg daily by Dr. Dumas.      A week later Azathioprine was prescribed (50 mg daily). She had a biochemical response to the higher dose of prednisone. She had resolution of all of her symptoms and a complete biochemical response. Her prednisone was further tapered down to 20 mg daily with a very slight bump in her LFTs.  Prednisone was increased to 25 mg, Azathioprine to 75 mg daily.      She has since achieved biochemical remission.   Off prednisone since April 2021.   Doing well on Azathioprine 75 mg daily (decreased from 100 mg)..     No symptoms of jaundice or abdominal pain.     She has established care with a PCP, Dr. Rhodes.      Jan 2024 Labs  ALT 24, AST 16    Review of Systems  Review of Systems   All other systems reviewed and are negative.  Uless otherwise stated in the HPI.    Past Medical History   has a past medical history of Personal history of other diseases of the nervous system and sense organs.     Social History   reports that she has never smoked. She has never been exposed to tobacco smoke. She has never used smokeless tobacco. She reports that she does not currently use alcohol. She reports that she does not use drugs.     Family History  family history is not on file.     Allergies  Allergies   Allergen Reactions   • Guaifenesin Rash       Medications  Current Outpatient Medications   Medication Instructions   • acetaminophen (Tylenol) 325 mg tablet oral   • azaTHIOprine (IMURAN) 75 mg, oral, Daily   • cholecalciferol (Vitamin D-3) 25 MCG (1000 UT) capsule 1 tablet, oral, Daily   • coenzyme Q-10 10 mg capsule oral   •  "ibuprofen 200 mg tablet oral   • rosuvastatin (Crestor) 20 mg tablet TAKE 1 TABLET OTHER 1 TABLET ON MONDAY 1 TABLET ON FRIDAY CO-Q10 200 MG MONDAY WEDNESDAY AND FRIDAY        Objective  There were no vitals taken for this visit.         12/13/2021     4:10 PM 12/13/2021     4:14 PM 3/11/2022    12:34 PM 10/14/2022     1:38 PM 10/19/2022    11:40 AM 1/26/2024     9:08 AM 4/4/2024     2:36 PM   Vitals   Systolic  108  107  122 111   Diastolic  62  70  80 73   Heart Rate    77  78 91   Temp    36.4 °C (97.6 °F)  36.3 °C (97.3 °F) 35.9 °C (96.6 °F)   Resp      16    Height (in)    1.575 m (5' 2\")  1.575 m (5' 2\")    Weight (lb) 164  160 162 160 164 165   BMI 29.28 kg/m2  28.57 kg/m2 29.63 kg/m2 29.26 kg/m2 30 kg/m2 30.18 kg/m2   BSA (m2) 1.81 m2  1.79 m2 1.79 m2 1.78 m2 1.8 m2 1.81 m2   Visit Report      Report Report     No physical exam performed - telephone visit.    Labs    WBC   Date/Time Value Ref Range Status   04/04/2024 04:08 PM 5.1 4.4 - 11.3 x10*3/uL Final     Hemoglobin   Date/Time Value Ref Range Status   04/04/2024 04:08 PM 12.5 12.0 - 16.0 g/dL Final     Hematocrit   Date/Time Value Ref Range Status   04/04/2024 04:08 PM 38.7 36.0 - 46.0 % Final     MCV   Date/Time Value Ref Range Status   04/04/2024 04:08  80 - 100 fL Final     Platelets   Date/Time Value Ref Range Status   04/04/2024 04:08  150 - 450 x10*3/uL Final        Total Protein   Date/Time Value Ref Range Status   04/04/2024 04:08 PM 7.2 6.4 - 8.2 g/dL Final     Albumin   Date/Time Value Ref Range Status   04/04/2024 04:08 PM 4.6 3.4 - 5.0 g/dL Final     AST   Date/Time Value Ref Range Status   04/04/2024 04:08 PM 15 9 - 39 U/L Final     ALT   Date/Time Value Ref Range Status   04/04/2024 04:08 PM 12 7 - 45 U/L Final     Comment:     Patients treated with Sulfasalazine may generate falsely decreased results for ALT.     Alkaline Phosphatase   Date/Time Value Ref Range Status   04/04/2024 04:08 PM 73 33 - 110 U/L Final     Bilirubin, " "Total   Date/Time Value Ref Range Status   04/04/2024 04:08 PM 0.3 0.0 - 1.2 mg/dL Final     Bilirubin, Direct   Date/Time Value Ref Range Status   04/04/2024 04:08 PM 0.0 0.0 - 0.3 mg/dL Final        No results found for: \"VITD25\", \"VITAMINA\", \"VTAI\", \"VITEALPHA\", \"VITEGAMMA\"     AST   Date/Time Value Ref Range Status   04/04/2024 04:08 PM 15 9 - 39 U/L Final     ALT   Date/Time Value Ref Range Status   04/04/2024 04:08 PM 12 7 - 45 U/L Final     Comment:     Patients treated with Sulfasalazine may generate falsely decreased results for ALT.     Alkaline Phosphatase   Date/Time Value Ref Range Status   04/04/2024 04:08 PM 73 33 - 110 U/L Final     Bilirubin, Total   Date/Time Value Ref Range Status   04/04/2024 04:08 PM 0.3 0.0 - 1.2 mg/dL Final     Bilirubin, Direct   Date/Time Value Ref Range Status   04/04/2024 04:08 PM 0.0 0.0 - 0.3 mg/dL Final     Albumin   Date/Time Value Ref Range Status   04/04/2024 04:08 PM 4.6 3.4 - 5.0 g/dL Final     Total Protein   Date/Time Value Ref Range Status   04/04/2024 04:08 PM 7.2 6.4 - 8.2 g/dL Final        Protime   Date/Time Value Ref Range Status   12/06/2021 04:11 PM 10.8 9.8 - 13.4 sec Final     Comment:     Note new reference range as of 11/30/2021 at 10:00am.     INR   Date/Time Value Ref Range Status   12/06/2021 04:11 PM 0.9 0.9 - 1.1 Final     Fibroscan  2023    Results:     Median = 3.4 kPa       IQR/med= 8 %       CAP= 146 dB/m    Assessment/Plan  Chana Ramirez is a 58 y.o. female who [facility].    Impression: Autoimmune hepatitis - with a biochemical response. LFTs bumped (slightly) during initial taper when prednisone dose was reduced to 20 mg daily. Normal LFTs with subsequent changes. In remission.      Prednisone was discontinued - April 2021,  She is tolerating Azathioprine 75 mg - without any issues.  We reduced the dose of the Azathioprine at a prior appointment (from 100 mg) - will continue at this dose.   "   Statin use is safe in liver disease. Her autoimmune hepatitis is under control, with normal LFTs and biochemical remission. She can be safely prescribed a daily statin, if clinically indicated. I do note the recent Lipid panel. I encouraged her to discuss further with Dr. Rhodes.    We discussed her bowel symptoms. I have suggested increasing Metamucil daily (2 tsp daily as tolerated) to help regulate her bowel movements. We discussed proceeding with EGD and Colonoscopy to further evaluate her symptoms.    Plan:     RENEWAL: Azathioprine 75 mg daily (continue).   Fibroscan of the liver - 9/1/23 (no fibrosis):  EGD and Colonoscopy (as detailed above).  Repeat LFTs every 6m. Follow up in 6m.    Time on phone: 12 minutes  Time reviewing chart and documenting visit: 8 minutes.  Total time: 20 minutes.   Instructions      Cooper Lowry MD

## 2024-06-20 NOTE — PATIENT INSTRUCTIONS
Welcome to Dr. Cooper [facility].  Dr. Lowry sees patients at the following sites:  [place] [address] [facility] at [facility]  [address], [address] at [facility] at [facility], [facility] [address]    Dr. Lowry's hepatology care coordinator, Teresa FALLON, can be reached at [phone].  Dr. Lowry's , Christina Dominguez, can be reached at [phone].     We discussed you trying 2 tablespoons of Metamucil to see if it helps your GI symptoms.     Get labs in October. Get prior to follow up visit with Dr. Lowry.    Get an EGD and Colonoscopy now.   Call [phone] to schedule the first available with Dr. Lowry.     Follow up with Dr. Lowry as scheduled.

## 2024-06-20 NOTE — PROGRESS NOTES
Subjective     Follow up visit for AIH.    Telephone visit.    History of Present Illness:   Chana Ramirez is a 58 y.o. female who [facility] for a follow up appointment regarding a diagnosis of AIH.    Today, we discusse a number of gastrointestinal symptoms.    Nausea - since January. No emesis.  Belching, flatulence.   Some heartburn.    Last visit:  Described a tarry type of stool, not black. ? Pastey. Brown.   Also small pellets.  Previously, very regular.    PCP Dr. Rhodes, recommended Metamucil.  I had recommended taking it regularly. This helped to a degree. She has been taking it consistently. However, she is not back to baseline.     More specifically:  Since we last spoke - started using Metamucil table spoon daily. Gets some indigestion with a Metamucil.  Bowel movement frequency has improved, not at baseline. Frequency, ranges from daily to every other day.  No longer with pastey stools. + Lower abdominal discomfort.    Summary:  59 y/o F otherwise healthy admitted with a severe hepatitis and high LFTs at the end of March 2020 - first at [facility] and transferred downtown to [facility].     Prior to admission, she was feeling poorly and run down. Was seen at [facility] x 2 and prescribed Keflex for a possible UTI. She tested positive for Influenza A. COVID-19 PCR negative.      She has asked if it is possible that she had COVID prior to this hospitalization - also inquired about antibody testing.   She did complete antibody testing - SARS COV 2 IgG negative.     YAKOV, SMA both 1-80.  A liver biopsy was done - severe hepatitis but not classic for AIH.  She was started on prednisone 20 mg. F/u labs on April 2, 2020 not much better with ALT > 1000.   Prednisone was increased to 40 mg daily by Dr. Dumas.      A week later Azathioprine was prescribed (50 mg daily). She had a biochemical response to the higher dose of prednisone. She had resolution of all of her symptoms and a  complete biochemical response. Her prednisone was further tapered down to 20 mg daily with a very slight bump in her LFTs.  Prednisone was increased to 25 mg, Azathioprine to 75 mg daily.      She has since achieved biochemical remission.   Off prednisone since April 2021.   Doing well on Azathioprine 75 mg daily (decreased from 100 mg)..     No symptoms of jaundice or abdominal pain.     She has established care with a PCP, Dr. Rhodes.      Jan 2024 Labs  ALT 24, AST 16    Review of Systems  Review of Systems   All other systems reviewed and are negative.  Uless otherwise stated in the HPI.    Past Medical History   has a past medical history of Personal history of other diseases of the nervous system and sense organs.     Social History   reports that she has never smoked. She has never been exposed to tobacco smoke. She has never used smokeless tobacco. She reports that she does not currently use alcohol. She reports that she does not use drugs.     Family History  family history is not on file.     Allergies  Allergies   Allergen Reactions    Guaifenesin Rash       Medications  Current Outpatient Medications   Medication Instructions    acetaminophen (Tylenol) 325 mg tablet oral    azaTHIOprine (IMURAN) 75 mg, oral, Daily    cholecalciferol (Vitamin D-3) 25 MCG (1000 UT) capsule 1 tablet, oral, Daily    coenzyme Q-10 10 mg capsule oral    ibuprofen 200 mg tablet oral    rosuvastatin (Crestor) 20 mg tablet TAKE 1 TABLET OTHER 1 TABLET ON MONDAY 1 TABLET ON FRIDAY CO-Q10 200 MG MONDAY WEDNESDAY AND FRIDAY        Objective   There were no vitals taken for this visit.         12/13/2021     4:10 PM 12/13/2021     4:14 PM 3/11/2022    12:34 PM 10/14/2022     1:38 PM 10/19/2022    11:40 AM 1/26/2024     9:08 AM 4/4/2024     2:36 PM   Vitals   Systolic  108  107  122 111   Diastolic  62  70  80 73   Heart Rate    77  78 91   Temp    36.4 °C (97.6 °F)  36.3 °C (97.3 °F) 35.9 °C (96.6 °F)   Resp      16    Height (in)     "1.575 m (5' 2\")  1.575 m (5' 2\")    Weight (lb) 164  160 162 160 164 165   BMI 29.28 kg/m2  28.57 kg/m2 29.63 kg/m2 29.26 kg/m2 30 kg/m2 30.18 kg/m2   BSA (m2) 1.81 m2  1.79 m2 1.79 m2 1.78 m2 1.8 m2 1.81 m2   Visit Report      Report Report     No physical exam performed - telephone visit.    Labs    WBC   Date/Time Value Ref Range Status   04/04/2024 04:08 PM 5.1 4.4 - 11.3 x10*3/uL Final     Hemoglobin   Date/Time Value Ref Range Status   04/04/2024 04:08 PM 12.5 12.0 - 16.0 g/dL Final     Hematocrit   Date/Time Value Ref Range Status   04/04/2024 04:08 PM 38.7 36.0 - 46.0 % Final     MCV   Date/Time Value Ref Range Status   04/04/2024 04:08  80 - 100 fL Final     Platelets   Date/Time Value Ref Range Status   04/04/2024 04:08  150 - 450 x10*3/uL Final        Total Protein   Date/Time Value Ref Range Status   04/04/2024 04:08 PM 7.2 6.4 - 8.2 g/dL Final     Albumin   Date/Time Value Ref Range Status   04/04/2024 04:08 PM 4.6 3.4 - 5.0 g/dL Final     AST   Date/Time Value Ref Range Status   04/04/2024 04:08 PM 15 9 - 39 U/L Final     ALT   Date/Time Value Ref Range Status   04/04/2024 04:08 PM 12 7 - 45 U/L Final     Comment:     Patients treated with Sulfasalazine may generate falsely decreased results for ALT.     Alkaline Phosphatase   Date/Time Value Ref Range Status   04/04/2024 04:08 PM 73 33 - 110 U/L Final     Bilirubin, Total   Date/Time Value Ref Range Status   04/04/2024 04:08 PM 0.3 0.0 - 1.2 mg/dL Final     Bilirubin, Direct   Date/Time Value Ref Range Status   04/04/2024 04:08 PM 0.0 0.0 - 0.3 mg/dL Final        No results found for: \"VITD25\", \"VITAMINA\", \"VTAI\", \"VITEALPHA\", \"VITEGAMMA\"     AST   Date/Time Value Ref Range Status   04/04/2024 04:08 PM 15 9 - 39 U/L Final     ALT   Date/Time Value Ref Range Status   04/04/2024 04:08 PM 12 7 - 45 U/L Final     Comment:     Patients treated with Sulfasalazine may generate falsely decreased results for ALT.     Alkaline Phosphatase "   Date/Time Value Ref Range Status   04/04/2024 04:08 PM 73 33 - 110 U/L Final     Bilirubin, Total   Date/Time Value Ref Range Status   04/04/2024 04:08 PM 0.3 0.0 - 1.2 mg/dL Final     Bilirubin, Direct   Date/Time Value Ref Range Status   04/04/2024 04:08 PM 0.0 0.0 - 0.3 mg/dL Final     Albumin   Date/Time Value Ref Range Status   04/04/2024 04:08 PM 4.6 3.4 - 5.0 g/dL Final     Total Protein   Date/Time Value Ref Range Status   04/04/2024 04:08 PM 7.2 6.4 - 8.2 g/dL Final        Protime   Date/Time Value Ref Range Status   12/06/2021 04:11 PM 10.8 9.8 - 13.4 sec Final     Comment:     Note new reference range as of 11/30/2021 at 10:00am.     INR   Date/Time Value Ref Range Status   12/06/2021 04:11 PM 0.9 0.9 - 1.1 Final     Fibroscan  2023    Results:     Median = 3.4 kPa       IQR/med= 8 %       CAP= 146 dB/m    Assessment/Plan   Chana Ramirez is a 58 y.o. female who [facility] for [facility].    Impression: Autoimmune hepatitis - with a biochemical response. LFTs bumped (slightly) during initial taper when prednisone dose was reduced to 20 mg daily. Normal LFTs with subsequent changes. In remission.      Prednisone was discontinued - April 2021,  She is tolerating Azathioprine 75 mg - without any issues.  We reduced the dose of the Azathioprine at a prior appointment (from 100 mg) - will continue at this dose.     Statin use is safe in liver disease. Her autoimmune hepatitis is under control, with normal LFTs and biochemical remission. She can be safely prescribed a daily statin, if clinically indicated. I do note the recent Lipid panel. I encouraged her to discuss further with Dr. Rhodes.    We discussed her bowel symptoms. I have suggested increasing Metamucil daily (2 tsp daily as tolerated) to help regulate her bowel movements. We discussed proceeding with EGD and Colonoscopy to further evaluate her symptoms.    Plan:     RENEWAL: Azathioprine 75 mg daily (continue).    Fibroscan of the liver - 9/1/23 (no fibrosis):  EGD and Colonoscopy (as detailed above).  Repeat LFTs every 6m. Follow up in 6m.    Time on phone: 12 minutes  Time reviewing chart and documenting visit: 8 minutes.  Total time: 20 minutes.   Instructions      Cooper Lowry MD

## 2024-06-20 NOTE — TELEPHONE ENCOUNTER
Hepatology Nurse Coordinator Note  Patient had an office visit with Dr. Lowry today. Questions/concerns discussed during visit. No further needs at this time.

## 2024-08-09 ENCOUNTER — HOSPITAL ENCOUNTER (OUTPATIENT)
Dept: RADIOLOGY | Facility: HOSPITAL | Age: 59
Discharge: HOME | End: 2024-08-09
Payer: COMMERCIAL

## 2024-08-09 ENCOUNTER — TELEPHONE (OUTPATIENT)
Dept: PRIMARY CARE | Facility: CLINIC | Age: 59
End: 2024-08-09
Payer: COMMERCIAL

## 2024-08-09 VITALS — HEIGHT: 62 IN | WEIGHT: 162 LBS | BODY MASS INDEX: 29.81 KG/M2

## 2024-08-09 DIAGNOSIS — R92.8 ABNORMAL MAMMOGRAM: ICD-10-CM

## 2024-08-09 DIAGNOSIS — R92.8 ABNORMAL MAMMOGRAM: Primary | ICD-10-CM

## 2024-08-09 DIAGNOSIS — Z12.31 ENCOUNTER FOR SCREENING MAMMOGRAM FOR MALIGNANT NEOPLASM OF BREAST: ICD-10-CM

## 2024-08-09 PROCEDURE — 76642 ULTRASOUND BREAST LIMITED: CPT | Mod: LT

## 2024-08-09 PROCEDURE — 77061 BREAST TOMOSYNTHESIS UNI: CPT | Mod: LT

## 2024-08-09 NOTE — TELEPHONE ENCOUNTER
Pt at [facility] did the mammogram, needs US of the Left breast also, please put an order with Dx of abnormal mammogram

## 2024-09-04 ENCOUNTER — TELEPHONE (OUTPATIENT)
Dept: GASTROENTEROLOGY | Facility: HOSPITAL | Age: 59
End: 2024-09-04

## 2024-09-04 ENCOUNTER — TELEMEDICINE (OUTPATIENT)
Dept: PRIMARY CARE | Facility: CLINIC | Age: 59
End: 2024-09-04
Payer: COMMERCIAL

## 2024-09-04 DIAGNOSIS — U07.1 COVID-19 VIRUS INFECTION: Primary | ICD-10-CM

## 2024-09-04 DIAGNOSIS — B34.9 VIRAL SYNDROME: ICD-10-CM

## 2024-09-04 PROCEDURE — 99213 OFFICE O/P EST LOW 20 MIN: CPT | Performed by: INTERNAL MEDICINE

## 2024-09-04 NOTE — PROGRESS NOTES
Subjective   Chana Ramirez is a 58 y.o. female who presents for a virtual exam tested positive for COVID.  DAQUAN Zayas was last seen by me October 19, 2022 she also follows with Dr. Cooper Lowry MD hepatology which you have seen more recently September 14, 2023 for autoimmune hepatitis required steroid taper and azathioprine history of dyslipidemia on rosuvastatin patient tested positive for COVID on Monday, September 2, 2024 she is not complaining of sinus congestion mild symptoms otherwise no fever chills no nausea vomiting, she is on Imuran patient is concerned she is in immunosuppression.   Review of Systems  10 system are pertinent as above  Objective   Virtual  There were no vitals taken for this visit.     Physical Exam  Virtual visit      Assessment/Plan     Tested positive for COVID-19  On immunosuppressive medicine  Patient is high risk  Will start Paxlovid  Stop rosuvastatin for the time you are on Paxlovid  Patient voiced full understanding   Fluids and rest  Call if not better    Additional medical issues reviewed    Prevention  Colonoscopy 05/22/20219  Mammogram Dr Lobo last Mamm on record 09/24/22, reordered per patient request  Bone density needed  CACS 32.74 12/2020    Acid reflux   Will try OC Pepcid  Avoid spicy food   If not better will let me know    Immunization  Flu vaccine  10/2024  pneumonia vaccine age 67  Shingles vaccine considering , now with a little sinus issues  Corona vaccine 2/2    Continue with the low-fat, low-cholesterol diet,  I recommended Mediterranean diet, which include fish, chicken, vegetables and olive oil  Exercise daily for 30 minutes at least 3 times a week  Continue home medications  Rosuvastatin 20 mg M,W,F    History of autoimmune hepatitis  Follows with  hepatology  Last visit was September 14, 2023  She appears to be doing well  On azathioprine 75 mg daily steroid completed          Problem List Items Addressed This Visit    None  Visit  Diagnoses       COVID-19 virus infection    -  Primary    Relevant Medications    nirmatrelvir-ritonavir (Paxlovid) 300 mg (150 mg x 2)-100 mgi tablet therapy pack              Kit Rhodes MD

## 2024-09-11 ENCOUNTER — TELEPHONE (OUTPATIENT)
Dept: PRIMARY CARE | Facility: CLINIC | Age: 59
End: 2024-09-11
Payer: COMMERCIAL

## 2024-09-11 NOTE — TELEPHONE ENCOUNTER
Patient called requesting to receive seasick patches for herself as well as her  patient Carlos Stricklandbrian [birth year].

## 2024-09-12 DIAGNOSIS — T75.3XXA SEA SICKNESS, INITIAL ENCOUNTER: Primary | ICD-10-CM

## 2024-09-12 RX ORDER — SCOLOPAMINE TRANSDERMAL SYSTEM 1 MG/1
1 PATCH, EXTENDED RELEASE TRANSDERMAL
Qty: 10 PATCH | Refills: 0 | Status: SHIPPED | OUTPATIENT
Start: 2024-09-12 | End: 2024-11-11

## 2024-10-02 DIAGNOSIS — R19.4 CHANGE IN BOWEL HABITS: Primary | ICD-10-CM

## 2024-10-02 RX ORDER — POLYETHYLENE GLYCOL 3350, SODIUM SULFATE ANHYDROUS, SODIUM BICARBONATE, SODIUM CHLORIDE, POTASSIUM CHLORIDE 236; 22.74; 6.74; 5.86; 2.97 G/4L; G/4L; G/4L; G/4L; G/4L
4000 POWDER, FOR SOLUTION ORAL ONCE
Qty: 4000 ML | Refills: 0 | Status: SHIPPED | OUTPATIENT
Start: 2024-10-02 | End: 2024-10-02

## 2024-10-02 NOTE — PROGRESS NOTES
Bowel preparation has been prescribed for patient's upcoming colonoscopy procedure.    Cooper Lowry MD  Gastroenterology

## 2024-10-10 ENCOUNTER — ANESTHESIA EVENT (OUTPATIENT)
Dept: GASTROENTEROLOGY | Facility: HOSPITAL | Age: 59
End: 2024-10-10
Payer: COMMERCIAL

## 2024-10-10 ENCOUNTER — APPOINTMENT (OUTPATIENT)
Dept: GASTROENTEROLOGY | Facility: CLINIC | Age: 59
End: 2024-10-10
Payer: COMMERCIAL

## 2024-10-11 ENCOUNTER — ANESTHESIA (OUTPATIENT)
Dept: GASTROENTEROLOGY | Facility: HOSPITAL | Age: 59
End: 2024-10-11
Payer: COMMERCIAL

## 2024-10-11 ENCOUNTER — HOSPITAL ENCOUNTER (OUTPATIENT)
Dept: GASTROENTEROLOGY | Facility: HOSPITAL | Age: 59
Setting detail: OUTPATIENT SURGERY
Discharge: HOME | End: 2024-10-11
Payer: COMMERCIAL

## 2024-10-11 VITALS
TEMPERATURE: 96.8 F | BODY MASS INDEX: 29.81 KG/M2 | DIASTOLIC BLOOD PRESSURE: 71 MMHG | HEIGHT: 62 IN | HEART RATE: 65 BPM | OXYGEN SATURATION: 98 % | SYSTOLIC BLOOD PRESSURE: 112 MMHG | RESPIRATION RATE: 15 BRPM | WEIGHT: 162 LBS

## 2024-10-11 DIAGNOSIS — K75.4 AUTOIMMUNE HEPATITIS (MULTI): ICD-10-CM

## 2024-10-11 DIAGNOSIS — R10.33 UMBILICAL PAIN: ICD-10-CM

## 2024-10-11 DIAGNOSIS — R14.0 BLOATING SYMPTOM: Primary | ICD-10-CM

## 2024-10-11 DIAGNOSIS — R11.0 NAUSEA: ICD-10-CM

## 2024-10-11 DIAGNOSIS — K21.9 GASTROESOPHAGEAL REFLUX DISEASE WITHOUT ESOPHAGITIS: ICD-10-CM

## 2024-10-11 DIAGNOSIS — R19.4 CHANGE IN BOWEL HABITS: ICD-10-CM

## 2024-10-11 PROCEDURE — 43239 EGD BIOPSY SINGLE/MULTIPLE: CPT | Performed by: INTERNAL MEDICINE

## 2024-10-11 PROCEDURE — 3700000001 HC GENERAL ANESTHESIA TIME - INITIAL BASE CHARGE

## 2024-10-11 PROCEDURE — 45385 COLONOSCOPY W/LESION REMOVAL: CPT | Performed by: INTERNAL MEDICINE

## 2024-10-11 PROCEDURE — 3700000002 HC GENERAL ANESTHESIA TIME - EACH INCREMENTAL 1 MINUTE

## 2024-10-11 PROCEDURE — 2500000004 HC RX 250 GENERAL PHARMACY W/ HCPCS (ALT 636 FOR OP/ED): Performed by: NURSE ANESTHETIST, CERTIFIED REGISTERED

## 2024-10-11 RX ORDER — ONDANSETRON HYDROCHLORIDE 2 MG/ML
4 INJECTION, SOLUTION INTRAVENOUS ONCE AS NEEDED
OUTPATIENT
Start: 2024-10-11

## 2024-10-11 RX ORDER — LIDOCAINE HYDROCHLORIDE 10 MG/ML
0.1 INJECTION, SOLUTION EPIDURAL; INFILTRATION; INTRACAUDAL; PERINEURAL ONCE
OUTPATIENT
Start: 2024-10-11 | End: 2024-10-11

## 2024-10-11 RX ORDER — PROPOFOL 10 MG/ML
INJECTION, EMULSION INTRAVENOUS AS NEEDED
Status: DISCONTINUED | OUTPATIENT
Start: 2024-10-11 | End: 2024-10-11

## 2024-10-11 RX ORDER — MIDAZOLAM HYDROCHLORIDE 1 MG/ML
INJECTION INTRAMUSCULAR; INTRAVENOUS AS NEEDED
Status: DISCONTINUED | OUTPATIENT
Start: 2024-10-11 | End: 2024-10-11

## 2024-10-11 RX ORDER — SODIUM CHLORIDE, SODIUM LACTATE, POTASSIUM CHLORIDE, CALCIUM CHLORIDE 600; 310; 30; 20 MG/100ML; MG/100ML; MG/100ML; MG/100ML
50 INJECTION, SOLUTION INTRAVENOUS CONTINUOUS
OUTPATIENT
Start: 2024-10-11 | End: 2024-10-12

## 2024-10-11 RX ORDER — ACETAMINOPHEN 325 MG/1
650 TABLET ORAL EVERY 4 HOURS PRN
OUTPATIENT
Start: 2024-10-11

## 2024-10-11 RX ORDER — FENTANYL CITRATE 50 UG/ML
INJECTION, SOLUTION INTRAMUSCULAR; INTRAVENOUS AS NEEDED
Status: DISCONTINUED | OUTPATIENT
Start: 2024-10-11 | End: 2024-10-11

## 2024-10-11 ASSESSMENT — PAIN SCALES - GENERAL
PAINLEVEL_OUTOF10: 0 - NO PAIN

## 2024-10-11 ASSESSMENT — PAIN - FUNCTIONAL ASSESSMENT
PAIN_FUNCTIONAL_ASSESSMENT: 0-10

## 2024-10-11 NOTE — ANESTHESIA POSTPROCEDURE EVALUATION
Patient: Chana Ramirez    Procedure Summary       Date: 10/11/24 Room / Location: [facility]    Anesthesia Start: 0921 Anesthesia Stop: 1014    Procedures:       COLONOSCOPY      EGD Diagnosis:       Autoimmune hepatitis (Multi)      Change in bowel habits      Umbilical pain    Scheduled Providers: Cooper Lowry MD Responsible Provider: Chanelle Pereira MD    Anesthesia Type: MAC ASA Status: 2            Anesthesia Type: MAC    Vitals Value Taken Time   BP 96/61 10/11/24 1013   Temp 36 °C (96.8 °F) 10/11/24 1013   Pulse 70 10/11/24 1013   Resp 16 10/11/24 1013   SpO2 96 % 10/11/24 1013       Anesthesia Post Evaluation    Patient location during evaluation: bedside  Patient participation: complete - patient cannot participate  Level of consciousness: sedated  Pain management: adequate  Airway patency: patent  Cardiovascular status: acceptable  Respiratory status: acceptable  Hydration status: acceptable  Postoperative Nausea and Vomiting: none    There were no known notable events for this encounter.

## 2024-10-11 NOTE — H&P
History Of Present Illness  Chana Ramirez is a 59 y.o. female presenting for EGD and Colonoscopy.     EGD for upper GI symptoms.  Colonoscopy for change in stools.    Refer to [facility] note.    Past Medical History  Past Medical History:   Diagnosis Date    Fibromyalgia     GERD (gastroesophageal reflux disease)     Hyperlipidemia     Immunosuppression     Liver disease     autoimmune hepatitis    Personal history of other diseases of the nervous system and sense organs     History of conjunctivitis     Surgical History  Past Surgical History:   Procedure Laterality Date    DILATION AND CURETTAGE OF UTERUS  01/15/2014    Dilation And Curettage    OTHER SURGICAL HISTORY  07/08/2020    Colonoscopy    US GUIDED NEEDLE LIVER BIOPSY  3/23/2020    US GUIDED NEEDLE LIVER BIOPSY 3/23/2020 [facility] INPATIENT LEGACY     Social History  She reports that she has never smoked. She has never been exposed to tobacco smoke. She has never used smokeless tobacco. She reports that she does not currently use alcohol. She reports that she does not use drugs.    Family History  No family history on file.     Allergies  Allergies   Allergen Reactions    Guaifenesin Rash     Review of Systems     Physical Exam  Constitutional:       General: She is awake.      Appearance: Normal appearance. She is obese.   HENT:      Head: Normocephalic and atraumatic.      Nose: Nose normal.      Mouth/Throat:      Mouth: Mucous membranes are moist.   Eyes:      Pupils: Pupils are equal, round, and reactive to light.   Neck:      Thyroid: No thyroid mass.      Trachea: Phonation normal.   Cardiovascular:      Rate and Rhythm: Normal rate and regular rhythm.      Heart sounds: Normal heart sounds. No murmur heard.     No gallop.   Pulmonary:      Effort: Pulmonary effort is normal. No respiratory distress.      Breath sounds: Normal air entry. No decreased breath sounds, wheezing, rhonchi or rales.   Abdominal:      General: Bowel sounds are normal.  "There is no distension.      Palpations: Abdomen is soft.      Tenderness: There is no abdominal tenderness.   Musculoskeletal:      Cervical back: Neck supple.      Right lower leg: No edema.      Left lower leg: No edema.   Skin:     General: Skin is warm.      Capillary Refill: Capillary refill takes less than 2 seconds.   Neurological:      General: No focal deficit present.      Mental Status: She is alert and oriented to person, place, and time. Mental status is at baseline.      Cranial Nerves: Cranial nerves 2-12 are intact.      Motor: Motor function is intact.   Psychiatric:         Attention and Perception: Attention and perception normal.         Mood and Affect: Mood normal.         Speech: Speech normal.         Behavior: Behavior normal.          Last Recorded Vitals  Blood pressure 127/88, pulse 80, temperature 36.4 °C (97.5 °F), resp. rate 16, height 1.575 m (5' 2\"), weight 73.5 kg (162 lb), SpO2 96%.    Assessment/Plan   EGD and Colonoscopy with anesthesia services.    R/b/a discussed with patient.     Cooper Lowry MD  "

## 2024-10-11 NOTE — ANESTHESIA PREPROCEDURE EVALUATION
Patient: Chana Ramirez    Procedure Information       Date/Time: 10/11/24 0910    Scheduled providers: Cooper Lowry MD    Procedures:       COLONOSCOPY      EGD    Location: [facility]            Relevant Problems   Anesthesia (within normal limits)      Cardiac   (+) Hyperlipidemia      Pulmonary  Covid positive 9/4/24  No famiy hx of MH      Neuro (within normal limits)      GI   (+) Gastroesophageal reflux disease      /Renal (within normal limits)      Liver   (+) Autoimmune hepatitis (Multi)      Endocrine (within normal limits)      Hematology (within normal limits)      Musculoskeletal   (+) Fibromyalgia   (+) Osteoarthritis of multiple joints      HEENT (within normal limits)      ID   (+) COVID-19 virus infection   (+) Viral syndrome      Skin (within normal limits)      GYN (within normal limits)       Clinical information reviewed:                   NPO Detail:  No data recorded     Physical Exam    Airway  Mallampati: II  TM distance: >3 FB     Cardiovascular - normal exam     Dental   Comments: Chipped bottom front   Pulmonary - normal exam     Abdominal          Vitals:    10/11/24 0841   BP: 127/88   Pulse: 80   Resp: 16   Temp: 36.4 °C (97.5 °F)   SpO2: 96%       Past Surgical History:   Procedure Laterality Date    DILATION AND CURETTAGE OF UTERUS  01/15/2014    Dilation And Curettage    OTHER SURGICAL HISTORY  07/08/2020    Colonoscopy    US GUIDED NEEDLE LIVER BIOPSY  3/23/2020    US GUIDED NEEDLE LIVER BIOPSY 3/23/2020 [facility] INPATIENT LEGACY     Past Medical History:   Diagnosis Date    Liver disease     autoimmune hepatitis    Personal history of other diseases of the nervous system and sense organs     History of conjunctivitis       Current Outpatient Medications:     azaTHIOprine (Imuran) 50 mg tablet, Take 1.5 tablets (75 mg) by mouth once daily., Disp: 135 tablet, Rfl: 3    coenzyme Q-10 10 mg capsule, Take by mouth., Disp: , Rfl:     ibuprofen 200 mg tablet, Take  by mouth., Disp: , Rfl:     rosuvastatin (Crestor) 20 mg tablet, TAKE 1 TABLET OTHER 1 TABLET ON MONDAY 1 TABLET ON FRIDAY CO-Q10 200 MG MONDAY WEDNESDAY AND FRIDAY, Disp: 90 tablet, Rfl: 3    scopolamine (Transderm-Scop) 1 mg over 3 days patch 3 day, Place 1 patch over 72 hours on the skin every 3rd day if needed (nausea)., Disp: 10 patch, Rfl: 0    acetaminophen (Tylenol) 325 mg tablet, Take by mouth., Disp: , Rfl:     cholecalciferol (Vitamin D-3) 25 MCG (1000 UT) capsule, Take 1 tablet by mouth once daily., Disp: , Rfl:   Prior to Admission medications    Medication Sig Start Date End Date Taking? Authorizing Provider   azaTHIOprine (Imuran) 50 mg tablet Take 1.5 tablets (75 mg) by mouth once daily. 6/20/24 6/20/25 Yes Cooper Lowry MD   coenzyme Q-10 10 mg capsule Take by mouth.   Yes Historical Provider, MD   ibuprofen 200 mg tablet Take by mouth.   Yes Historical Provider, MD   rosuvastatin (Crestor) 20 mg tablet TAKE 1 TABLET OTHER 1 TABLET ON MONDAY 1 TABLET ON FRIDAY CO-Q10 200 MG MONDAY WEDNESDAY AND FRIDAY 10/5/23  Yes Kit Rhodes MD   scopolamine (Transderm-Scop) 1 mg over 3 days patch 3 day Place 1 patch over 72 hours on the skin every 3rd day if needed (nausea). 9/12/24 11/11/24 Yes Kit Rhodes MD   acetaminophen (Tylenol) 325 mg tablet Take by mouth.    Historical Provider, MD   cholecalciferol (Vitamin D-3) 25 MCG (1000 UT) capsule Take 1 tablet by mouth once daily.    Historical Provider, MD     Allergies   Allergen Reactions    Guaifenesin Rash     Social History     Tobacco Use    Smoking status: Never     Passive exposure: Never    Smokeless tobacco: Never   Substance Use Topics    Alcohol use: Not Currently         Chemistry    Lab Results   Component Value Date/Time     10/25/2022 1655    K 4.0 10/25/2022 1655     10/25/2022 1655    CO2 29 10/25/2022 1655    BUN 18 10/25/2022 1655    CREATININE 0.67 10/25/2022 1655    Lab Results   Component Value Date/Time    CALCIUM 9.0  10/25/2022 1655    ALKPHOS 73 04/04/2024 1608    AST 15 04/04/2024 1608    ALT 12 04/04/2024 1608    BILITOT 0.3 04/04/2024 1608          Lab Results   Component Value Date/Time    WBC 5.1 04/04/2024 1608    HGB 12.5 04/04/2024 1608    HCT 38.7 04/04/2024 1608     04/04/2024 1608     Lab Results   Component Value Date/Time    PROTIME 10.8 12/06/2021 1611    INR 0.9 12/06/2021 1611     No results found for this or any previous visit (from the past 4464 hour(s)).  No results found for this or any previous visit from the past 1095 days.       Anesthesia Plan    History of general anesthesia?: yes  History of complications of general anesthesia?: no    ASA 2     MAC     intravenous induction   Anesthetic plan and risks discussed with patient.  Use of blood products discussed with patient who consented to blood products.    Plan discussed with CRNA.

## 2024-10-14 ENCOUNTER — LAB (OUTPATIENT)
Dept: LAB | Facility: LAB | Age: 59
End: 2024-10-14
Payer: COMMERCIAL

## 2024-10-14 DIAGNOSIS — K75.4 AUTOIMMUNE HEPATITIS (MULTI): ICD-10-CM

## 2024-10-14 LAB
ALBUMIN SERPL BCP-MCNC: 4.2 G/DL (ref 3.4–5)
ALP SERPL-CCNC: 73 U/L (ref 33–110)
ALT SERPL W P-5'-P-CCNC: 10 U/L (ref 7–45)
AST SERPL W P-5'-P-CCNC: 13 U/L (ref 9–39)
BASOPHILS # BLD AUTO: 0.04 X10*3/UL (ref 0–0.1)
BASOPHILS NFR BLD AUTO: 0.7 %
BILIRUB DIRECT SERPL-MCNC: 0 MG/DL (ref 0–0.3)
BILIRUB SERPL-MCNC: 0.3 MG/DL (ref 0–1.2)
EOSINOPHIL # BLD AUTO: 0.33 X10*3/UL (ref 0–0.7)
EOSINOPHIL NFR BLD AUTO: 6.2 %
ERYTHROCYTE [DISTWIDTH] IN BLOOD BY AUTOMATED COUNT: 12.7 % (ref 11.5–14.5)
HCT VFR BLD AUTO: 38.7 % (ref 36–46)
HGB BLD-MCNC: 12.4 G/DL (ref 12–16)
IMM GRANULOCYTES # BLD AUTO: 0.01 X10*3/UL (ref 0–0.7)
IMM GRANULOCYTES NFR BLD AUTO: 0.2 % (ref 0–0.9)
LYMPHOCYTES # BLD AUTO: 1 X10*3/UL (ref 1.2–4.8)
LYMPHOCYTES NFR BLD AUTO: 18.7 %
MCH RBC QN AUTO: 31.9 PG (ref 26–34)
MCHC RBC AUTO-ENTMCNC: 32 G/DL (ref 32–36)
MCV RBC AUTO: 100 FL (ref 80–100)
MONOCYTES # BLD AUTO: 0.43 X10*3/UL (ref 0.1–1)
MONOCYTES NFR BLD AUTO: 8 %
NEUTROPHILS # BLD AUTO: 3.55 X10*3/UL (ref 1.2–7.7)
NEUTROPHILS NFR BLD AUTO: 66.2 %
NRBC BLD-RTO: 0 /100 WBCS (ref 0–0)
PLATELET # BLD AUTO: 298 X10*3/UL (ref 150–450)
PROT SERPL-MCNC: 7.1 G/DL (ref 6.4–8.2)
RBC # BLD AUTO: 3.89 X10*6/UL (ref 4–5.2)
WBC # BLD AUTO: 5.4 X10*3/UL (ref 4.4–11.3)

## 2024-10-14 PROCEDURE — 84155 ASSAY OF PROTEIN SERUM: CPT

## 2024-10-14 PROCEDURE — 82247 BILIRUBIN TOTAL: CPT

## 2024-10-14 PROCEDURE — 84075 ASSAY ALKALINE PHOSPHATASE: CPT

## 2024-10-14 PROCEDURE — 85025 COMPLETE CBC W/AUTO DIFF WBC: CPT

## 2024-10-14 PROCEDURE — 36415 COLL VENOUS BLD VENIPUNCTURE: CPT

## 2024-10-14 PROCEDURE — 82784 ASSAY IGA/IGD/IGG/IGM EACH: CPT

## 2024-10-14 PROCEDURE — 84460 ALANINE AMINO (ALT) (SGPT): CPT

## 2024-10-14 PROCEDURE — 82040 ASSAY OF SERUM ALBUMIN: CPT

## 2024-10-14 PROCEDURE — 84450 TRANSFERASE (AST) (SGOT): CPT

## 2024-10-15 LAB
IGA SERPL-MCNC: 240 MG/DL (ref 70–400)
IGG SERPL-MCNC: 1110 MG/DL (ref 700–1600)
IGM SERPL-MCNC: 135 MG/DL (ref 40–230)

## 2024-10-16 ENCOUNTER — OFFICE VISIT (OUTPATIENT)
Dept: GASTROENTEROLOGY | Facility: HOSPITAL | Age: 59
End: 2024-10-16
Payer: COMMERCIAL

## 2024-10-16 VITALS
HEIGHT: 62 IN | BODY MASS INDEX: 30.18 KG/M2 | OXYGEN SATURATION: 98 % | TEMPERATURE: 97.9 F | HEART RATE: 87 BPM | SYSTOLIC BLOOD PRESSURE: 101 MMHG | WEIGHT: 164 LBS | DIASTOLIC BLOOD PRESSURE: 68 MMHG

## 2024-10-16 DIAGNOSIS — Z79.899 HIGH RISK MEDICATION USE: ICD-10-CM

## 2024-10-16 DIAGNOSIS — Z71.2 ENCOUNTER TO DISCUSS TEST RESULTS: ICD-10-CM

## 2024-10-16 DIAGNOSIS — K75.4 AUTOIMMUNE HEPATITIS (MULTI): Primary | ICD-10-CM

## 2024-10-16 PROCEDURE — 99214 OFFICE O/P EST MOD 30 MIN: CPT | Performed by: INTERNAL MEDICINE

## 2024-10-16 PROCEDURE — 3008F BODY MASS INDEX DOCD: CPT | Performed by: INTERNAL MEDICINE

## 2024-10-16 RX ORDER — AZATHIOPRINE 50 MG/1
50 TABLET ORAL DAILY
Qty: 90 TABLET | Refills: 3 | Status: SHIPPED | OUTPATIENT
Start: 2024-10-16 | End: 2025-10-16

## 2024-10-16 ASSESSMENT — PAIN SCALES - GENERAL: PAINLEVEL_OUTOF10: 0-NO PAIN

## 2024-10-16 NOTE — LETTER
October 22, 2024     Kit Rhodes MD  [address] [address]    Patient: Chana Ramirez   YOB: 1965   Date of Visit: 10/16/2024       Dear Dr. Kit Rhodes MD:    Thank you for referring Chana Ramirez to me for evaluation. Below are my notes for this consultation.  If you have questions, please do not hesitate to call me. I look forward to following your patient along with you.       Sincerely,     Cooper Lowry MD      CC: No Recipients  ______________________________________________________________________________________    Subjective    Follow up visit for AIH.    History of Present Illness:   Chana Ramirez is a 59 y.o. female who presents to the [facility] for a follow up appointment regarding a diagnosis of AIH.    At her last visit, we discussed a number of gastrointestinal symptoms. She just completed an endoscopic evaluation with EGD/Colonoscopy with me last Friday. Doing well after her procedures. Surgical pathology from biopsies still pending at the time of this appointment.    Previous symptoms:  Nausea - since January. No emesis.  Belching, flatulence.   Some heartburn.  All have improved, but have not entirely resolved.     PCP Dr. Rhodes, recommended Metamucil.  I had recommended taking it regularly. This helped to a degree.     Regarding her AIH, she remains on stable dosing of Azathioprine for the last 1.5 years. We discussed lowering the dose at today's appointment.    Summary:  60 y/o F otherwise healthy admitted with a severe hepatitis and high LFTs at the end of March 2020 - first at [facility] and transferred downtown to [facility].     Prior to admission, she was feeling poorly and run down. Was seen at the [facility] x 2 and prescribed Keflex for a possible UTI. She tested positive for Influenza A. COVID-19 PCR negative.      She has asked if it is possible that she had COVID prior to this hospitalization - also inquired about antibody  testing.   She did complete antibody testing - SARS COV 2 IgG negative.     YAKOV, SMA both 1-80.  A liver biopsy was done - severe hepatitis but not classic for AIH.  She was started on prednisone 20 mg. F/u labs on April 2, 2020 not much better with ALT > 1000.   Prednisone was increased to 40 mg daily by Dr. Dumas.      A week later Azathioprine was prescribed (50 mg daily). She had a biochemical response to the higher dose of prednisone. She had resolution of all of her symptoms and a complete biochemical response. Her prednisone was further tapered down to 20 mg daily with a very slight bump in her LFTs.  Prednisone was increased to 25 mg, Azathioprine to 75 mg daily.      She has since achieved biochemical remission.   Off prednisone since April 2021.   Doing well on Azathioprine 75 mg daily (decreased from 100 mg)..     Jan 2024 Labs  ALT 24, AST 16    Review of Systems  Review of Systems   All other systems reviewed and are negative.  Uless otherwise stated in the HPI.    Past Medical History   has a past medical history of Fibromyalgia, GERD (gastroesophageal reflux disease), Hyperlipidemia, Immunosuppression, Liver disease, and Personal history of other diseases of the nervous system and sense organs.     Social History   reports that she has never smoked. She has never been exposed to tobacco smoke. She has never used smokeless tobacco. She reports that she does not currently use alcohol. She reports that she does not use drugs.     Family History  family history is not on file.     Allergies  Allergies   Allergen Reactions   • Guaifenesin Rash       Medications  Current Outpatient Medications   Medication Instructions   • acetaminophen (Tylenol) 325 mg tablet Take by mouth.   • azaTHIOprine (IMURAN) 75 mg, oral, Daily   • cholecalciferol (Vitamin D-3) 25 MCG (1000 UT) capsule 1 tablet, Daily   • coenzyme Q-10 10 mg capsule Take by mouth.   • ibuprofen 200 mg tablet Take by mouth.   • NON FORMULARY  "Citracal with 400 mg calcium / Vitamin D# 500 international units BID   • rosuvastatin (Crestor) 20 mg tablet TAKE 1 TABLET OTHER 1 TABLET ON MONDAY 1 TABLET ON FRIDAY CO-Q10 200 MG MONDAY WEDNESDAY AND FRIDAY   • scopolamine (Transderm-Scop) 1 mg over 3 days patch 3 day 1 patch, transdermal, Every 72 hours PRN        Objective  Visit Vitals  /68   Pulse 87   Temp 36.6 °C (97.9 °F)            8/9/2024     9:54 AM 10/11/2024     8:41 AM 10/11/2024    10:10 AM 10/11/2024    10:13 AM 10/11/2024    10:25 AM 10/11/2024    10:40 AM 10/16/2024     4:00 PM   Vitals   Systolic  127 96 96 106 112 101   Diastolic  88 61 61 72 71 68   Heart Rate  80 70 70 64 65 87   Temp  36.4 °C (97.5 °F) 36 °C (96.8 °F) 36 °C (96.8 °F)   36.6 °C (97.9 °F)   Resp  16 16 16 14 15    Height (in) 1.575 m (5' 2\") 1.575 m (5' 2\")     1.575 m (5' 2\")   Weight (lb) 162 162     164   BMI 29.63 kg/m2 29.63 kg/m2     30 kg/m2   BSA (m2) 1.79 m2 1.79 m2     1.8 m2   Visit Report       Report     No physical exam performed - telephone visit.    Labs    WBC   Date/Time Value Ref Range Status   10/14/2024 04:52 PM 5.4 4.4 - 11.3 x10*3/uL Final     Hemoglobin   Date/Time Value Ref Range Status   10/14/2024 04:52 PM 12.4 12.0 - 16.0 g/dL Final     Hematocrit   Date/Time Value Ref Range Status   10/14/2024 04:52 PM 38.7 36.0 - 46.0 % Final     MCV   Date/Time Value Ref Range Status   10/14/2024 04:52  80 - 100 fL Final     Platelets   Date/Time Value Ref Range Status   10/14/2024 04:52  150 - 450 x10*3/uL Final        Total Protein   Date/Time Value Ref Range Status   10/14/2024 04:52 PM 7.1 6.4 - 8.2 g/dL Final     Albumin   Date/Time Value Ref Range Status   10/14/2024 04:52 PM 4.2 3.4 - 5.0 g/dL Final     AST   Date/Time Value Ref Range Status   10/14/2024 04:52 PM 13 9 - 39 U/L Final     ALT   Date/Time Value Ref Range Status   10/14/2024 04:52 PM 10 7 - 45 U/L Final     Comment:     Patients treated with Sulfasalazine may generate " "falsely decreased results for ALT.     Alkaline Phosphatase   Date/Time Value Ref Range Status   10/14/2024 04:52 PM 73 33 - 110 U/L Final     Bilirubin, Total   Date/Time Value Ref Range Status   10/14/2024 04:52 PM 0.3 0.0 - 1.2 mg/dL Final     Bilirubin, Direct   Date/Time Value Ref Range Status   10/14/2024 04:52 PM 0.0 0.0 - 0.3 mg/dL Final        No results found for: \"VITD25\", \"VITAMINA\", \"VTAI\", \"VITEALPHA\", \"VITEGAMMA\"     AST   Date/Time Value Ref Range Status   10/14/2024 04:52 PM 13 9 - 39 U/L Final     ALT   Date/Time Value Ref Range Status   10/14/2024 04:52 PM 10 7 - 45 U/L Final     Comment:     Patients treated with Sulfasalazine may generate falsely decreased results for ALT.     Alkaline Phosphatase   Date/Time Value Ref Range Status   10/14/2024 04:52 PM 73 33 - 110 U/L Final     Bilirubin, Total   Date/Time Value Ref Range Status   10/14/2024 04:52 PM 0.3 0.0 - 1.2 mg/dL Final     Bilirubin, Direct   Date/Time Value Ref Range Status   10/14/2024 04:52 PM 0.0 0.0 - 0.3 mg/dL Final     Albumin   Date/Time Value Ref Range Status   10/14/2024 04:52 PM 4.2 3.4 - 5.0 g/dL Final     Total Protein   Date/Time Value Ref Range Status   10/14/2024 04:52 PM 7.1 6.4 - 8.2 g/dL Final        Protime   Date/Time Value Ref Range Status   12/06/2021 04:11 PM 10.8 9.8 - 13.4 sec Final     Comment:     Note new reference range as of 11/30/2021 at 10:00am.     INR   Date/Time Value Ref Range Status   12/06/2021 04:11 PM 0.9 0.9 - 1.1 Final     Fibroscan    2023    Results:     Median = 3.4 kPa       IQR/med= 8 %       CAP= 146 dB/m    Assessment/Plan  Chana Ramirez is a 59 y.o. female who presents to Hepatology clinic for follow up.    Impression: Autoimmune hepatitis - with a biochemical response. LFTs bumped (slightly) during initial taper when prednisone dose was reduced to 20 mg daily. Normal LFTs with subsequent changes. AIH remains In remission.      Prednisone was discontinued - April 2021,  She is " tolerating Azathioprine 75 mg - without any issues.  We discussed reducing her dose to 50 mg daily at today's office visit.     Statin use is safe in liver disease. Her autoimmune hepatitis is under control, with normal LFTs and biochemical remission. She can be safely prescribed a daily statin, if clinically indicated.     Her bowel symptoms appear to have improved. I encourage her to continue with a fiber supplement such as Metamucil daily (2 tsp daily as tolerated) to help regulate her bowel movements.     Awaiting pathology results from her recent procedures.     Plan:     Dose change: Azathioprine 50 mg daily.  Fibroscan of the liver - 9/1/23 (no fibrosis):  EGD and Colonoscopy - completed, path pending..  Repeat LFTs monthly for the next 3 months. Follow up in 6m.    Cooper Lowry MD    Instructions      Cooper Lowry MD

## 2024-10-16 NOTE — ADDENDUM NOTE
Encounter addended by: Jaqueline Bello RN on: 10/16/2024 9:15 AM   Actions taken: Flowsheet accepted

## 2024-10-16 NOTE — PATIENT INSTRUCTIONS
Welcome to Dr. Cooper Low[facility].  Dr. Lowry sees patients at the following sites:  [place] [address] [facility] at [facility]  [address], [address] at [facility] at [facility], [facility] [address]    Dr. Lowry's hepatology care coordinator, Teresa FALLON, can be reached at [phone].  Dr. Lowry's , Christina Dominguez, can be reached at [phone].     Lowered your dose of azathioprine to 50 mg.    I'd like to check labs monthly for the next 3 months.    Results from your endoscopy/colonoscopy - will probably be available next week. I will message you once I review them.

## 2024-10-16 NOTE — PROGRESS NOTES
Subjective     Follow up visit for AIH.    History of Present Illness:   Chana Ramirez is a 59 y.o. female who presents to the [facility] for a follow up appointment regarding a diagnosis of AIH.    At her last visit, we discussed a number of gastrointestinal symptoms. She just completed an endoscopic evaluation with EGD/Colonoscopy with me last Friday. Doing well after her procedures. Surgical pathology from biopsies still pending at the time of this appointment.    Previous symptoms:  Nausea - since January. No emesis.  Belching, flatulence.   Some heartburn.  All have improved, but have not entirely resolved.     PCP Dr. Rhodes, recommended Metamucil.  I had recommended taking it regularly. This helped to a degree.     Regarding her AIH, she remains on stable dosing of Azathioprine for the last 1.5 years. We discussed lowering the dose at today's appointment.    Summary:  60 y/o F otherwise healthy admitted with a severe hepatitis and high LFTs at the end of March 2020 - first at [facility] and transferred downtown to [facility].     Prior to admission, she was feeling poorly and run down. Was seen at the [facility] x 2 and prescribed Keflex for a possible UTI. She tested positive for Influenza A. COVID-19 PCR negative.      She has asked if it is possible that she had COVID prior to this hospitalization - also inquired about antibody testing.   She did complete antibody testing - SARS COV 2 IgG negative.     YAKOV, SMA both 1-80.  A liver biopsy was done - severe hepatitis but not classic for AIH.  She was started on prednisone 20 mg. F/u labs on April 2, 2020 not much better with ALT > 1000.   Prednisone was increased to 40 mg daily by Dr. Dumas.      A week later Azathioprine was prescribed (50 mg daily). She had a biochemical response to the higher dose of prednisone. She had resolution of all of her symptoms and a complete biochemical response. Her prednisone was further tapered down to 20 mg daily  with a very slight bump in her LFTs.  Prednisone was increased to 25 mg, Azathioprine to 75 mg daily.      She has since achieved biochemical remission.   Off prednisone since April 2021.   Doing well on Azathioprine 75 mg daily (decreased from 100 mg)..     Jan 2024 Labs  ALT 24, AST 16    Review of Systems  Review of Systems   All other systems reviewed and are negative.  Uless otherwise stated in the HPI.    Past Medical History   has a past medical history of Fibromyalgia, GERD (gastroesophageal reflux disease), Hyperlipidemia, Immunosuppression, Liver disease, and Personal history of other diseases of the nervous system and sense organs.     Social History   reports that she has never smoked. She has never been exposed to tobacco smoke. She has never used smokeless tobacco. She reports that she does not currently use alcohol. She reports that she does not use drugs.     Family History  family history is not on file.     Allergies  Allergies   Allergen Reactions    Guaifenesin Rash       Medications  Current Outpatient Medications   Medication Instructions    acetaminophen (Tylenol) 325 mg tablet Take by mouth.    azaTHIOprine (IMURAN) 75 mg, oral, Daily    cholecalciferol (Vitamin D-3) 25 MCG (1000 UT) capsule 1 tablet, Daily    coenzyme Q-10 10 mg capsule Take by mouth.    ibuprofen 200 mg tablet Take by mouth.    NON FORMULARY Citracal with 400 mg calcium / Vitamin D# 500 international units BID    rosuvastatin (Crestor) 20 mg tablet TAKE 1 TABLET OTHER 1 TABLET ON MONDAY 1 TABLET ON FRIDAY CO-Q10 200 MG MONDAY WEDNESDAY AND FRIDAY    scopolamine (Transderm-Scop) 1 mg over 3 days patch 3 day 1 patch, transdermal, Every 72 hours PRN        Objective   Visit Vitals  /68   Pulse 87   Temp 36.6 °C (97.9 °F)            8/9/2024     9:54 AM 10/11/2024     8:41 AM 10/11/2024    10:10 AM 10/11/2024    10:13 AM 10/11/2024    10:25 AM 10/11/2024    10:40 AM 10/16/2024     4:00 PM   Vitals   Systolic  127 96 96  "106 112 101   Diastolic  88 61 61 72 71 68   Heart Rate  80 70 70 64 65 87   Temp  36.4 °C (97.5 °F) 36 °C (96.8 °F) 36 °C (96.8 °F)   36.6 °C (97.9 °F)   Resp  16 16 16 14 15    Height (in) 1.575 m (5' 2\") 1.575 m (5' 2\")     1.575 m (5' 2\")   Weight (lb) 162 162     164   BMI 29.63 kg/m2 29.63 kg/m2     30 kg/m2   BSA (m2) 1.79 m2 1.79 m2     1.8 m2   Visit Report       Report     No physical exam performed - telephone visit.    Labs    WBC   Date/Time Value Ref Range Status   10/14/2024 04:52 PM 5.4 4.4 - 11.3 x10*3/uL Final     Hemoglobin   Date/Time Value Ref Range Status   10/14/2024 04:52 PM 12.4 12.0 - 16.0 g/dL Final     Hematocrit   Date/Time Value Ref Range Status   10/14/2024 04:52 PM 38.7 36.0 - 46.0 % Final     MCV   Date/Time Value Ref Range Status   10/14/2024 04:52  80 - 100 fL Final     Platelets   Date/Time Value Ref Range Status   10/14/2024 04:52  150 - 450 x10*3/uL Final        Total Protein   Date/Time Value Ref Range Status   10/14/2024 04:52 PM 7.1 6.4 - 8.2 g/dL Final     Albumin   Date/Time Value Ref Range Status   10/14/2024 04:52 PM 4.2 3.4 - 5.0 g/dL Final     AST   Date/Time Value Ref Range Status   10/14/2024 04:52 PM 13 9 - 39 U/L Final     ALT   Date/Time Value Ref Range Status   10/14/2024 04:52 PM 10 7 - 45 U/L Final     Comment:     Patients treated with Sulfasalazine may generate falsely decreased results for ALT.     Alkaline Phosphatase   Date/Time Value Ref Range Status   10/14/2024 04:52 PM 73 33 - 110 U/L Final     Bilirubin, Total   Date/Time Value Ref Range Status   10/14/2024 04:52 PM 0.3 0.0 - 1.2 mg/dL Final     Bilirubin, Direct   Date/Time Value Ref Range Status   10/14/2024 04:52 PM 0.0 0.0 - 0.3 mg/dL Final        No results found for: \"VITD25\", \"VITAMINA\", \"VTAI\", \"VITEALPHA\", \"VITEGAMMA\"     AST   Date/Time Value Ref Range Status   10/14/2024 04:52 PM 13 9 - 39 U/L Final     ALT   Date/Time Value Ref Range Status   10/14/2024 04:52 PM 10 7 - 45 U/L " Final     Comment:     Patients treated with Sulfasalazine may generate falsely decreased results for ALT.     Alkaline Phosphatase   Date/Time Value Ref Range Status   10/14/2024 04:52 PM 73 33 - 110 U/L Final     Bilirubin, Total   Date/Time Value Ref Range Status   10/14/2024 04:52 PM 0.3 0.0 - 1.2 mg/dL Final     Bilirubin, Direct   Date/Time Value Ref Range Status   10/14/2024 04:52 PM 0.0 0.0 - 0.3 mg/dL Final     Albumin   Date/Time Value Ref Range Status   10/14/2024 04:52 PM 4.2 3.4 - 5.0 g/dL Final     Total Protein   Date/Time Value Ref Range Status   10/14/2024 04:52 PM 7.1 6.4 - 8.2 g/dL Final        Protime   Date/Time Value Ref Range Status   12/06/2021 04:11 PM 10.8 9.8 - 13.4 sec Final     Comment:     Note new reference range as of 11/30/2021 at 10:00am.     INR   Date/Time Value Ref Range Status   12/06/2021 04:11 PM 0.9 0.9 - 1.1 Final     Fibroscan    2023    Results:     Median = 3.4 kPa       IQR/med= 8 %       CAP= 146 dB/m    Assessment/Plan   Chana Ramirez is a 59 y.o. female who presents to Hepatology clinic for follow up.    Impression: Autoimmune hepatitis - with a biochemical response. LFTs bumped (slightly) during initial taper when prednisone dose was reduced to 20 mg daily. Normal LFTs with subsequent changes. AIH remains In remission.      Prednisone was discontinued - April 2021,  She is tolerating Azathioprine 75 mg - without any issues.  We discussed reducing her dose to 50 mg daily at today's office visit.     Statin use is safe in liver disease. Her autoimmune hepatitis is under control, with normal LFTs and biochemical remission. She can be safely prescribed a daily statin, if clinically indicated.     Her bowel symptoms appear to have improved. I encourage her to continue with a fiber supplement such as Metamucil daily (2 tsp daily as tolerated) to help regulate her bowel movements.     Awaiting pathology results from her recent procedures.     Plan:     Dose change:  Azathioprine 50 mg daily.  Fibroscan of the liver - 9/1/23 (no fibrosis):  EGD and Colonoscopy - completed, path pending..  Repeat LFTs monthly for the next 3 months. Follow up in 6m.    Cooper Lowry MD    Instructions      Cooper Lowry MD

## 2024-10-22 LAB
LABORATORY COMMENT REPORT: NORMAL
PATH REPORT.FINAL DX SPEC: NORMAL
PATH REPORT.GROSS SPEC: NORMAL
PATH REPORT.TOTAL CANCER: NORMAL

## 2024-12-06 ENCOUNTER — LAB (OUTPATIENT)
Dept: LAB | Facility: LAB | Age: 59
End: 2024-12-06
Payer: COMMERCIAL

## 2024-12-06 DIAGNOSIS — K75.4 AUTOIMMUNE HEPATITIS (MULTI): ICD-10-CM

## 2024-12-06 DIAGNOSIS — Z71.2 ENCOUNTER TO DISCUSS TEST RESULTS: ICD-10-CM

## 2024-12-06 LAB
ALBUMIN SERPL BCP-MCNC: 4.5 G/DL (ref 3.4–5)
ALP SERPL-CCNC: 76 U/L (ref 33–110)
ALT SERPL W P-5'-P-CCNC: 11 U/L (ref 7–45)
AST SERPL W P-5'-P-CCNC: 13 U/L (ref 9–39)
BILIRUB DIRECT SERPL-MCNC: 0 MG/DL (ref 0–0.3)
BILIRUB SERPL-MCNC: 0.3 MG/DL (ref 0–1.2)
PROT SERPL-MCNC: 7.3 G/DL (ref 6.4–8.2)

## 2024-12-06 PROCEDURE — 82040 ASSAY OF SERUM ALBUMIN: CPT

## 2024-12-06 PROCEDURE — 36415 COLL VENOUS BLD VENIPUNCTURE: CPT

## 2024-12-06 PROCEDURE — 84155 ASSAY OF PROTEIN SERUM: CPT

## 2024-12-06 PROCEDURE — 82247 BILIRUBIN TOTAL: CPT

## 2024-12-06 PROCEDURE — 84450 TRANSFERASE (AST) (SGOT): CPT

## 2024-12-06 PROCEDURE — 84075 ASSAY ALKALINE PHOSPHATASE: CPT

## 2024-12-06 PROCEDURE — 84460 ALANINE AMINO (ALT) (SGPT): CPT

## 2025-01-02 ENCOUNTER — APPOINTMENT (OUTPATIENT)
Dept: GASTROENTEROLOGY | Facility: CLINIC | Age: 60
End: 2025-01-02
Payer: COMMERCIAL

## 2025-01-05 ENCOUNTER — DOCUMENTATION (OUTPATIENT)
Dept: GASTROENTEROLOGY | Facility: HOSPITAL | Age: 60
End: 2025-01-05
Payer: COMMERCIAL

## 2025-01-05 NOTE — PROGRESS NOTES
Patient called inquiring if its okay to take TussinDM Max for her cold and flu symptoms. I instructed her that it was safe from her autoimmune hepatitis standpoint to take this OTC medication. All her questions were answered to full satisfaction.

## 2025-02-17 NOTE — PROGRESS NOTES
Subjective   Chana Ramirez is a 59 y.o. female who presents for physical exam.  DAQUAN Zayas was last seen by me October 19, 2022 she also follows with Dr. Cooper Lowry MD hepatology which you have seen more recently September 14, 2023 for autoimmune hepatitis required steroid taper and azathioprine history of dyslipidemia was prescribed rosuvastatin which she takes regularly last refilled October 5, 2023 patient was in the management complaint no chest pain shortness of breath fever chill nausea vomiting constipation diarrhea this urgency frequency.  Patient is here for fasting blood works, physical exam. Constipation from time to time, with bloating, Acid reflux.   Review of Systems  10 system are pertinent as above  Objective     Visit Vitals  /82   Pulse 74   Temp 36.5 °C (97.7 °F)   Resp 14        Physical Exam  HEENT: Atraumatic normocephalic the pupils are equal and round and reactive to light the sclerae nonicteric extraocular motion are intact.  Neck: Is supple without JVD no carotid bruits the trachea is midline there are no masses pulses are equal and bilateral with normal upstroke.  Skin: Normal.  Skin good texture.  Moist.  Good turgor.  No lesions, no rashes.  Lymph: No lymphadenopathy appreciated, no masses, no lesions  Lungs: Are clear to auscultation and percussion, good breath sounds bilaterally, no rhonchi, no wheezing, good diaphragmatic excursion.  Heart: Normal rate and normal rhythm S1, S2, no S3, no gallop, murmur or rub.  Abdomen: Soft, nontender, no organomegaly, good bowel sounds.    Extremities: Full range of motion, good pulses bilateral.  No cyanosis, no clubbing or edema.  Neuro: Cranial nerves II-XII are grossly intact there is no sensory or motor deficits.  Able to move all extremities.      Assessment/Plan     Patient is here for physical exam    Fasting blood works    CBC BMP lipids AST LT vitamin 25-hydroxy     Prevention  Colonoscopy 10/10/2024, EGD 10/11/2024    Mammogram Dr Lobo last Mamm on record 09/24/22, reordered per patient request 08/09/2024  Bone density needed osteopenia Vit d Calcium exercise rheumatology   CACS 32.74 12/2020    Acid reflux   Will try OC Pepcid  Avoid spicy food   If not better will let me know  Doing well    Immunization  Flu vaccine  10/2024  pneumonia vaccine age 65  Shingles vaccine needed  Corona vaccine 2/2  RSV needed    Continue with the low-fat, low-cholesterol diet,  I recommended Mediterranean diet, which include fish, chicken, vegetables and olive oil  Exercise daily for 30 minutes at least 3 times a week  Continue home medications  Rosuvastatin 20 mg M,W,F  Coq10 200 mg daily    History of autoimmune hepatitis  Follows with  hepatology  Last visit was September 14, 2023  She appears to be doing well  Was On azathioprine 75 mg daily steroid completed  Normal hepatic panel 12/06/2024  Decreased Azathioprine to 50 mg daily        Problem List Items Addressed This Visit       Autoimmune hepatitis (Multi)    Relevant Orders    Basic Metabolic Panel    AST    ALT    Hyperlipidemia - Primary    Relevant Orders    AST    ALT    Lipid Panel    Immunosuppression    Vitamin D deficiency    Relevant Orders    Vitamin D 25-Hydroxy,Total (for eval of Vitamin D levels)    Gastroesophageal reflux disease    Relevant Orders    CBC w/5 Part Differential, [facility]     Other Visit Diagnoses       Dyslipidemia        Relevant Orders    CT cardiac scoring wo IV contrast                Kit Rhodes MD

## 2025-02-21 ENCOUNTER — APPOINTMENT (OUTPATIENT)
Dept: PRIMARY CARE | Facility: CLINIC | Age: 60
End: 2025-02-21
Payer: COMMERCIAL

## 2025-02-21 VITALS
TEMPERATURE: 97.7 F | HEIGHT: 62 IN | RESPIRATION RATE: 14 BRPM | WEIGHT: 162 LBS | DIASTOLIC BLOOD PRESSURE: 82 MMHG | HEART RATE: 74 BPM | BODY MASS INDEX: 29.81 KG/M2 | SYSTOLIC BLOOD PRESSURE: 120 MMHG

## 2025-02-21 DIAGNOSIS — Z00.00 PHYSICAL EXAM: Primary | ICD-10-CM

## 2025-02-21 DIAGNOSIS — E55.9 VITAMIN D DEFICIENCY: ICD-10-CM

## 2025-02-21 DIAGNOSIS — E78.2 MODERATE MIXED HYPERLIPIDEMIA NOT REQUIRING STATIN THERAPY: ICD-10-CM

## 2025-02-21 DIAGNOSIS — K21.9 GASTROESOPHAGEAL REFLUX DISEASE WITHOUT ESOPHAGITIS: ICD-10-CM

## 2025-02-21 DIAGNOSIS — E78.5 DYSLIPIDEMIA: ICD-10-CM

## 2025-02-21 DIAGNOSIS — D84.9 IMMUNOSUPPRESSION: ICD-10-CM

## 2025-02-21 DIAGNOSIS — K75.4 AUTOIMMUNE HEPATITIS (MULTI): ICD-10-CM

## 2025-02-21 LAB
25(OH)D3 SERPL-MCNC: 34 NG/ML (ref 30–100)
ALT SERPL W P-5'-P-CCNC: 21 U/L (ref 16–63)
ANION GAP SERPL CALC-SCNC: 16 MMOL/L (ref 10–20)
AST SERPL W P-5'-P-CCNC: 14 U/L (ref 15–37)
BASOPHILS # BLD AUTO: 0.02 X10*3/UL (ref 0.1–1.6)
BASOPHILS NFR BLD AUTO: 0.42 % (ref 0–0.3)
BUN SERPL-MCNC: 15 MG/DL (ref 7–18)
CALCIUM SERPL-MCNC: 9.4 MG/DL (ref 8.5–10.1)
CHLORIDE SERPL-SCNC: 105 MMOL/L (ref 98–107)
CHOLEST SERPL-MCNC: 206 MG/DL (ref 0–199)
CHOLESTEROL/HDL RATIO: 2.5 (ref 4.2–7)
CO2 SERPL-SCNC: 28 MMOL/L (ref 21–32)
CREAT SERPL-MCNC: 0.67 MG/DL (ref 0.6–1.1)
EGFRCR SERPLBLD CKD-EPI 2021: >90 ML/MIN/1.73M*2
EOSINOPHIL # BLD AUTO: 0.23 X10*3/UL (ref 0.04–0.5)
EOSINOPHIL NFR BLD AUTO: 5.93 % (ref 0.7–7)
ERYTHROCYTE [DISTWIDTH] IN BLOOD BY AUTOMATED COUNT: 13.6 % (ref 11.5–14.5)
GLUCOSE SERPL-MCNC: 94 MG/DL (ref 74–100)
HCT VFR BLD AUTO: 38.2 % (ref 36.6–46.6)
HDLC SERPL-MCNC: 83 MG/DL (ref 40–59)
HGB BLD-MCNC: 12.77 G/DL (ref 12–15.4)
IS PATIENT FASTING: YES
LDLC SERPL DIRECT ASSAY-MCNC: 104 MG/DL (ref 0–100)
LYMPHOCYTES # BLD AUTO: 0.79 X10*3/UL (ref 0–6)
LYMPHOCYTES NFR BLD AUTO: 20.75 % (ref 20.5–51.1)
MCH RBC QN AUTO: 30.8 PG (ref 26–32)
MCHC RBC AUTO-ENTMCNC: 33.4 G/DL (ref 31–38)
MCV RBC AUTO: 92 FL (ref 80–96)
MONOCYTES # BLD AUTO: 0.31 X10*3/UL (ref 1.6–24.9)
MONOCYTES NFR BLD AUTO: 8.23 % (ref 1.7–9.3)
NEUTROPHILS # BLD AUTO: 2.46 X10*3/UL (ref 1.4–6.5)
NEUTROPHILS NFR BLD AUTO: 64.67 % (ref 42.2–75.2)
PLATELET # BLD AUTO: 325.1 X10*3/UL (ref 150–450)
PMV BLD AUTO: 7.59 FL (ref 7.8–11)
POTASSIUM SERPL-SCNC: 4.2 MMOL/L (ref 3.5–5.1)
RBC # BLD AUTO: 4.15 X10*6/UL (ref 3.9–5.3)
SODIUM SERPL-SCNC: 145 MMOL/L (ref 136–145)
TRIGL SERPL-MCNC: 72 MG/DL
WBC # BLD AUTO: 3.8 X10*3/UL (ref 4.5–10.5)

## 2025-02-21 PROCEDURE — 99396 PREV VISIT EST AGE 40-64: CPT | Performed by: INTERNAL MEDICINE

## 2025-02-21 PROCEDURE — 83721 ASSAY OF BLOOD LIPOPROTEIN: CPT | Performed by: INTERNAL MEDICINE

## 2025-02-21 PROCEDURE — 80048 BASIC METABOLIC PNL TOTAL CA: CPT | Performed by: INTERNAL MEDICINE

## 2025-02-21 PROCEDURE — 82306 VITAMIN D 25 HYDROXY: CPT | Performed by: INTERNAL MEDICINE

## 2025-02-21 PROCEDURE — 3008F BODY MASS INDEX DOCD: CPT | Performed by: INTERNAL MEDICINE

## 2025-02-21 PROCEDURE — 84450 TRANSFERASE (AST) (SGOT): CPT | Performed by: INTERNAL MEDICINE

## 2025-02-21 PROCEDURE — 85025 COMPLETE CBC W/AUTO DIFF WBC: CPT | Performed by: INTERNAL MEDICINE

## 2025-02-21 PROCEDURE — 80061 LIPID PANEL: CPT | Performed by: INTERNAL MEDICINE

## 2025-02-21 PROCEDURE — 84460 ALANINE AMINO (ALT) (SGPT): CPT | Performed by: INTERNAL MEDICINE

## 2025-02-21 ASSESSMENT — ENCOUNTER SYMPTOMS
OCCASIONAL FEELINGS OF UNSTEADINESS: 0
LOSS OF SENSATION IN FEET: 0
DEPRESSION: 0

## 2025-02-21 ASSESSMENT — PAIN SCALES - GENERAL: PAINLEVEL_OUTOF10: 0-NO PAIN

## 2025-03-14 ENCOUNTER — HOSPITAL ENCOUNTER (OUTPATIENT)
Dept: RADIOLOGY | Facility: HOSPITAL | Age: 60
Discharge: HOME | End: 2025-03-14
Payer: COMMERCIAL

## 2025-03-14 VITALS — BODY MASS INDEX: 30.18 KG/M2 | HEIGHT: 62 IN | WEIGHT: 164 LBS

## 2025-03-14 DIAGNOSIS — Z12.31 ENCOUNTER FOR SCREENING MAMMOGRAM FOR MALIGNANT NEOPLASM OF BREAST: ICD-10-CM

## 2025-03-14 PROCEDURE — 77063 BREAST TOMOSYNTHESIS BI: CPT

## 2025-05-02 ENCOUNTER — HOSPITAL ENCOUNTER (OUTPATIENT)
Dept: RADIOLOGY | Facility: CLINIC | Age: 60
Discharge: HOME | End: 2025-05-02
Payer: COMMERCIAL

## 2025-05-02 DIAGNOSIS — E78.5 DYSLIPIDEMIA: ICD-10-CM

## 2025-05-02 PROCEDURE — 75571 CT HRT W/O DYE W/CA TEST: CPT

## 2025-07-10 ENCOUNTER — APPOINTMENT (OUTPATIENT)
Dept: UROLOGY | Facility: CLINIC | Age: 60
End: 2025-07-10
Payer: COMMERCIAL

## 2025-07-10 VITALS
HEART RATE: 84 BPM | WEIGHT: 164 LBS | HEIGHT: 62 IN | DIASTOLIC BLOOD PRESSURE: 67 MMHG | BODY MASS INDEX: 30.18 KG/M2 | SYSTOLIC BLOOD PRESSURE: 100 MMHG

## 2025-07-10 DIAGNOSIS — N39.46 MIXED STRESS AND URGE INCONTINENCE: Primary | ICD-10-CM

## 2025-07-10 DIAGNOSIS — N94.10 DYSPAREUNIA, FEMALE: ICD-10-CM

## 2025-07-10 DIAGNOSIS — N95.8 GENITOURINARY SYNDROME OF MENOPAUSE: ICD-10-CM

## 2025-07-10 PROCEDURE — 99214 OFFICE O/P EST MOD 30 MIN: CPT | Performed by: OBSTETRICS & GYNECOLOGY

## 2025-07-10 PROCEDURE — 3008F BODY MASS INDEX DOCD: CPT | Performed by: OBSTETRICS & GYNECOLOGY

## 2025-07-10 RX ORDER — ESTRADIOL 0.1 MG/G
CREAM VAGINAL
Qty: 42.5 G | Refills: 3 | Status: SHIPPED | OUTPATIENT
Start: 2025-07-10

## 2025-07-10 NOTE — PROGRESS NOTES
FOLLOW-UP VISIT       HISTORY OF PRESENT ILLNESS:   Chana Ramirez is a 59 y.o. female presenting to me today for follow-up. Patient was last evaluated on 1/13/23 for YOEL and dyspareunia. At that visit I recommended trying vaginal estrogen and starting Vicky Lopez's online PFPT program called [facility] if unable to go in for PFPT sessions personally due to her work schedule. Plan was to have the patient follow-up in 3-6 months.     The patient reports she continues to experience intermittent leakage with cough/sneeze as well as urgency. States that bladder trainingand PFPT has helped her symptoms. She is not interested in a bladder medication at this time.     Regarding dyspareunia, she continues to experience pain with coitus. Did not try vaginal dilators, has not started vaginal estrogen yet.    PAST MEDICAL HISTORY:  Medical History[1]    PAST SURGICAL HISTORY:  Surgical History[2]     ALLERGIES:   Allergies[3]     MEDICATIONS:   Medication Documentation Review Audit       Reviewed by Judith Bowen MA (Medical Assistant) on 07/10/25 at 1312      Medication Order Taking? Sig Documenting Provider Last Dose Status   acetaminophen (Tylenol) 325 mg tablet [id number] Yes Take by mouth. Historical Provider, MD  Active   azaTHIOprine (Imuran) 50 mg tablet [id number] Yes Take 1 tablet (50 mg) by mouth once daily. Cooper Lowry MD  Active   cholecalciferol (Vitamin D-3) 25 MCG (1000 UT) capsule [id number] Yes Take 1 tablet by mouth once daily. Historical Provider, MD  Active   coenzyme Q-10 10 mg capsule [id number] Yes Take by mouth. Historical Provider, MD  Active   ibuprofen 200 mg tablet [id number]  Take by mouth. Historical Provider, MD  Active   NON FORMULARY [id number] Yes Citracal with 400 mg calcium / Vitamin D# 500 international units BID Historical Provider, MD  Active   rosuvastatin (Crestor) 20 mg tablet [id number] Yes TAKE 1 TABLET OTHER 1 TABLET ON MONDAY 1 TABLET ON FRIDAY CO-Q10 200 MG MONDAY WEDNESDAY AND  FRIDAY Kit Rhodes MD  Active                     SOCIAL HISTORY:  Patient  reports that she has quit smoking. Her smoking use included cigarettes. She has a 7.5 pack-year smoking history. She has never been exposed to tobacco smoke. She has never used smokeless tobacco. She reports current alcohol use. She reports that she does not use drugs.   Social History     Socioeconomic History    Marital status:      Spouse name: Not on file    Number of children: Not on file    Years of education: Not on file    Highest education level: Not on file   Occupational History    Not on file   Tobacco Use    Smoking status: Former     Current packs/day: 0.50     Average packs/day: 0.5 packs/day for 15.0 years (7.5 ttl pk-yrs)     Types: Cigarettes     Passive exposure: Never    Smokeless tobacco: Never   Vaping Use    Vaping status: Never Used   Substance and Sexual Activity    Alcohol use: Yes    Drug use: Never    Sexual activity: Yes     Partners: Male     Birth control/protection: Post-menopausal   Other Topics Concern    Not on file   Social History Narrative    Not on file     Social Drivers of Health     Financial Resource Strain: Not on file   Food Insecurity: Not on file   Transportation Needs: Not on file   Physical Activity: Not on file   Stress: Not on file   Social Connections: Not on file   Intimate Partner Violence: Not on file   Housing Stability: Not on file       FAMILY HISTORY:  Family History[4]    REVIEW OF SYSTEMS:  Constitutional: Negative for fever and chills. Denies anorexia, weight loss.  Eyes: Negative for visual disturbance.   Respiratory: Negative for shortness of breath.    Cardiovascular: Negative for chest pain.   Gastrointestinal: Negative for nausea and vomiting.   Genitourinary: See interval history above.  Skin: Negative for rash.   Neurological: Negative for dizziness and numbness.   Psychiatric/Behavioral: Negative for confusion and decreased concentration.     PHYSICAL EXAM:  Blood  "pressure 100/67, pulse 84, height 1.575 m (5' 2\"), weight 74.4 kg (164 lb).  Constitutional: Patient appears well-developed and well-nourished. No distress.   Head: Normocephalic and atraumatic.    Neck: Normal range of motion.    Cardiovascular: Normal rate.    Pulmonary/Chest: Effort normal. No respiratory distress.   Musculoskeletal: Normal range of motion.    Neurological: Alert and oriented to person, place, and time.  Psychiatric: Normal mood and affect. Behavior is normal. Thought content normal.       Assessment:      1. Mixed stress and urge incontinence        2. Genitourinary syndrome of menopause            Chana Ramirez is a 59 y.o. female with YOEL and GSM.     YOEL:   We discussed the possibility of starting a bladder medication, however the patient elects to not proceed at this time. Instead, she will continue with bladder training as this has provided benefit for her.     GSM:   We discussed that after  menopause the ruggae of the vaginal tissue flatten and become less stretchy. The moisture tends to decrease and the vaginal introitus can begin to narrow. Therefore, to improve all of these things we need a combination of therapies.     First, vaginal estrogen used three times a week will improve the blood flow to the tissues and help with overall lubrication.  On the days you're not using estrogen, use a vaginal moisturizer like V-magic, Replens or hyauloronic acid (Reveree).   Finally, to help stretch the opening to the vagina without causing pain, use incrementally increasing sizes of vaginal dilators.       The combination of these things will help to improve discomfort with intercourse, but you will still need a safe lubricant for intimacy. We recommend Uberlube, which is silicone based and protective for the vaginal tissue.       Plan:   Prescription for estradiol 0.01% vaginal cream sent to the patient's pharmacy.    Continue bladder training for YOEL.   Follow-up in 1 year with pap smear. "     Anastasia Schwartz MD MPH      Scribe Attestation  By signing my name below, I, Hanna MoncadaOlena mccarthy   attest that this documentation has been prepared under the direction and in the presence of Anastasia Schwartz MD MPH.            [1]   Past Medical History:  Diagnosis Date    Fibromyalgia     GERD (gastroesophageal reflux disease)     Hyperlipidemia     Immunosuppression     Liver disease     autoimmune hepatitis    Personal history of other diseases of the nervous system and sense organs     History of conjunctivitis    Urinary incontinence 7 years   [2]   Past Surgical History:  Procedure Laterality Date    DILATION AND CURETTAGE OF UTERUS  01/15/2014    Dilation And Curettage    OTHER SURGICAL HISTORY  07/08/2020    Colonoscopy    US GUIDED NEEDLE LIVER BIOPSY  3/23/2020    US GUIDED NEEDLE LIVER BIOPSY 3/23/2020 [facility]   [3]   Allergies  Allergen Reactions    Guaifenesin Rash   [4]   Family History  Problem Relation Name Age of Onset    Colon cancer Paternal Grandmother      Cancer Paternal Grandmother Penny 70 - 79    Heart disease Maternal Grandmother Betty 50 - 59    Heart disease Maternal Grandfather Parveen 50 - 59

## 2025-07-30 NOTE — PROGRESS NOTES
Called pharmacy and was on hold 15 minutes. [facility] hung up.    Subjective   Chana Ramirez is a 59 y.o. female who presents for review of her cardiac calcium score.  HPI  Chana is 59 she is here for a follow-up, history of autoimmune hepatitis Dr. Cooper Lowry MD hepatology completed steroids on azathioprine, history of dyslipidemia tolerating rosuvastatin, takes medication prescribed with no major medical denies chest pain shortness of breath fever chill nausea vomit constipation diarrhea dysuria urgency frequency.  Patient is here to review cardiac calcium score which is essentially normal except for 1 part of the left main and LAD junction.  She is active on a treadmill and weights without cardiovascular symptoms however when she hikes especially with extreme hikes she develops shortness of breath and occasional palpitations.  She had palpitation in a hot day which hve resolved with hydration and an air conditioned environment denies syncope nor near syncope.  Palpitation lasted for few minutes and then subsided and resolved once patient was hydrated in a comfortable air conditioned environment.    Review of Systems  10 system are pertinent as above  Objective     There were no vitals taken for this visit.       Physical Exam  HEENT: Atraumatic normocephalic the pupils are equal and round and reactive to light the sclerae nonicteric extraocular motion are intact.  Neck: Is supple without JVD no carotid bruits the trachea is midline there are no masses pulses are equal and bilateral with normal upstroke.  Skin: Normal.  Skin good texture.  Moist.  Good turgor.  No lesions, no rashes.  Lymph: No lymphadenopathy appreciated, no masses, no lesions  Lungs: Are clear to auscultation and percussion, good breath sounds bilaterally, no rhonchi, no wheezing, good diaphragmatic excursion.  Heart: Normal rate and normal rhythm S1, S2, no S3, no gallop, murmur or rub.  Abdomen: Soft, nontender, no organomegaly, good bowel sounds.    Extremities: Full range of motion, good pulses  bilateral.  No cyanosis, no clubbing or edema.  Neuro: Cranial nerves II-XII are grossly intact there is no sensory or motor deficits.  Able to move all extremities.      Assessment/Plan     Patient is here for multiple issues    Reviewed cardiac calcium score    Here to review CACS (05/02/2025)  Fasting blood works essentially normal except  Fot LM/LAD 50.3  LDLc 103 02/2025  In the settings of Autoimmune liver diseases Stable  After discussion a shared decision  Will increase  Crestor to 40 mg Mon,WED, Fri  Will follow Liver enzymes and lipd in 3-4 weeks  Baby ASA m,w,fri    Palpitations come and go  Primarily hot days with  sever exertion hiking up hill  Not with tredmill or when lifting  Essentially normal CACS       Prevention  Colonoscopy 10/10/2024, EGD 10/11/2024   Mammogram Dr Lobo last Mamm on record 09/24/22, reordered per patient request 08/09/2024  Bone density needed osteopenia Vit d Calcium exercise rheumatology   CACS 32.74 12/2020    Acid reflux   Will try OC Pepcid  Avoid spicy food   If not better will let me know  Doing well    Immunization  Flu vaccine  10/2024  pneumonia vaccine age 67  Shingles vaccine needed  Corona vaccine 2/2  RSV needed    Continue with the low-fat, low-cholesterol diet,  I recommended Mediterranean diet, which include fish, chicken, vegetables and olive oil  Exercise daily for 30 minutes at least 3 times a week  Continue home medications  Rosuvastatin 20 mg M,W,F  Coq10 200 mg daily    History of autoimmune hepatitis  Follows with  hepatology  Last visit was September 14, 2023  She appears to be doing well  Was On azathioprine 75 mg daily steroid completed  Normal hepatic panel 12/06/2024  Decreased Azathioprine to 50 mg daily        Problem List Items Addressed This Visit       Autoimmune hepatitis (Multi)    Relevant Orders    Hepatic function panel    Dyslipidemia - Primary    Relevant Orders    Lipid Panel             Kit Rhodes MD

## 2025-08-01 ENCOUNTER — APPOINTMENT (OUTPATIENT)
Dept: PRIMARY CARE | Facility: CLINIC | Age: 60
End: 2025-08-01
Payer: COMMERCIAL

## 2025-08-01 VITALS — BODY MASS INDEX: 30.91 KG/M2 | WEIGHT: 168 LBS | HEIGHT: 62 IN

## 2025-08-01 DIAGNOSIS — E78.5 HYPERLIPIDEMIA, UNSPECIFIED: ICD-10-CM

## 2025-08-01 DIAGNOSIS — E78.5 DYSLIPIDEMIA: Primary | ICD-10-CM

## 2025-08-01 DIAGNOSIS — R00.2 PALPITATION: ICD-10-CM

## 2025-08-01 DIAGNOSIS — K75.4 AUTOIMMUNE HEPATITIS (MULTI): ICD-10-CM

## 2025-08-01 PROBLEM — R17 JAUNDICE: Status: RESOLVED | Noted: 2024-03-27 | Resolved: 2025-08-01

## 2025-08-01 PROBLEM — H10.9 CONJUNCTIVITIS: Status: RESOLVED | Noted: 2024-03-27 | Resolved: 2025-08-01

## 2025-08-01 RX ORDER — ROSUVASTATIN CALCIUM 40 MG/1
TABLET, COATED ORAL
Qty: 30 TABLET | Refills: 3 | Status: SHIPPED | OUTPATIENT
Start: 2025-08-01 | End: 2025-08-02

## 2025-08-01 RX ORDER — ROSUVASTATIN CALCIUM 40 MG/1
TABLET, COATED ORAL
Qty: 30 TABLET | Refills: 3 | Status: SHIPPED | OUTPATIENT
Start: 2025-08-01 | End: 2025-08-01

## 2025-08-01 ASSESSMENT — ENCOUNTER SYMPTOMS
DEPRESSION: 0
OCCASIONAL FEELINGS OF UNSTEADINESS: 0
LOSS OF SENSATION IN FEET: 0

## 2025-08-01 ASSESSMENT — PAIN SCALES - GENERAL: PAINLEVEL_OUTOF10: 0-NO PAIN

## 2025-08-02 RX ORDER — ROSUVASTATIN CALCIUM 40 MG/1
TABLET, COATED ORAL
Qty: 30 TABLET | Refills: 3 | Status: SHIPPED | OUTPATIENT
Start: 2025-08-02

## 2025-08-19 LAB
ALBUMIN SERPL-MCNC: 4.6 G/DL (ref 3.6–5.1)
ALBUMIN/GLOB SERPL: 1.7 (CALC) (ref 1–2.5)
ALP SERPL-CCNC: 77 U/L (ref 37–153)
ALT SERPL-CCNC: 11 U/L (ref 6–29)
ANION GAP SERPL CALCULATED.4IONS-SCNC: 11 MMOL/L (CALC) (ref 7–17)
AST SERPL-CCNC: 14 U/L (ref 10–35)
BILIRUB DIRECT SERPL-MCNC: 0.1 MG/DL
BILIRUB INDIRECT SERPL-MCNC: 0.3 MG/DL (CALC) (ref 0.2–1.2)
BILIRUB SERPL-MCNC: 0.4 MG/DL (ref 0.2–1.2)
BUN SERPL-MCNC: 19 MG/DL (ref 7–25)
BUN/CREAT SERPL: NORMAL (CALC) (ref 6–22)
CALCIUM SERPL-MCNC: 9.6 MG/DL (ref 8.6–10.4)
CHLORIDE SERPL-SCNC: 104 MMOL/L (ref 98–110)
CO2 SERPL-SCNC: 26 MMOL/L (ref 20–32)
CREAT SERPL-MCNC: 0.75 MG/DL (ref 0.5–1.03)
EGFRCR SERPLBLD CKD-EPI 2021: 93 ML/MIN/1.73M2
GLOBULIN SER CALC-MCNC: 2.7 G/DL (CALC) (ref 1.9–3.7)
GLUCOSE SERPL-MCNC: 122 MG/DL (ref 65–139)
MAGNESIUM SERPL-MCNC: 2.1 MG/DL (ref 1.5–2.5)
POTASSIUM SERPL-SCNC: 4.1 MMOL/L (ref 3.5–5.3)
PROT SERPL-MCNC: 7.3 G/DL (ref 6.1–8.1)
SODIUM SERPL-SCNC: 141 MMOL/L (ref 135–146)
TSH SERPL-ACNC: 3.07 MIU/L (ref 0.4–4.5)

## 2025-08-21 ENCOUNTER — OFFICE VISIT (OUTPATIENT)
Dept: GASTROENTEROLOGY | Facility: CLINIC | Age: 60
End: 2025-08-21
Payer: COMMERCIAL

## 2025-08-21 VITALS
OXYGEN SATURATION: 97 % | HEART RATE: 81 BPM | WEIGHT: 170 LBS | DIASTOLIC BLOOD PRESSURE: 73 MMHG | BODY MASS INDEX: 31.28 KG/M2 | SYSTOLIC BLOOD PRESSURE: 109 MMHG | TEMPERATURE: 97.2 F | HEIGHT: 62 IN

## 2025-08-21 DIAGNOSIS — Z71.2 ENCOUNTER TO DISCUSS TEST RESULTS: ICD-10-CM

## 2025-08-21 DIAGNOSIS — K75.4 AUTOIMMUNE HEPATITIS (MULTI): Primary | ICD-10-CM

## 2025-08-21 DIAGNOSIS — Z79.899 HIGH RISK MEDICATION USE: ICD-10-CM

## 2025-08-21 PROCEDURE — 3008F BODY MASS INDEX DOCD: CPT | Performed by: INTERNAL MEDICINE

## 2025-08-21 PROCEDURE — 99212 OFFICE O/P EST SF 10 MIN: CPT | Performed by: INTERNAL MEDICINE

## 2025-08-21 PROCEDURE — 99214 OFFICE O/P EST MOD 30 MIN: CPT | Performed by: INTERNAL MEDICINE

## 2025-08-21 RX ORDER — AZATHIOPRINE 50 MG/1
25 TABLET ORAL DAILY
Qty: 45 TABLET | Refills: 3 | Status: SHIPPED | OUTPATIENT
Start: 2025-08-21 | End: 2026-08-21

## 2025-08-21 ASSESSMENT — PAIN SCALES - GENERAL: PAINLEVEL_OUTOF10: 0-NO PAIN

## 2026-07-10 ENCOUNTER — APPOINTMENT (OUTPATIENT)
Dept: UROLOGY | Facility: CLINIC | Age: 61
End: 2026-07-10
Payer: COMMERCIAL